# Patient Record
Sex: FEMALE | Race: WHITE | NOT HISPANIC OR LATINO | Employment: OTHER | URBAN - NONMETROPOLITAN AREA
[De-identification: names, ages, dates, MRNs, and addresses within clinical notes are randomized per-mention and may not be internally consistent; named-entity substitution may affect disease eponyms.]

---

## 2017-02-02 ENCOUNTER — OFFICE VISIT (OUTPATIENT)
Dept: MEDICAL GROUP | Facility: CLINIC | Age: 52
End: 2017-02-02
Payer: MEDICARE

## 2017-02-02 VITALS
DIASTOLIC BLOOD PRESSURE: 80 MMHG | SYSTOLIC BLOOD PRESSURE: 120 MMHG | HEIGHT: 63 IN | BODY MASS INDEX: 42.88 KG/M2 | RESPIRATION RATE: 18 BRPM | HEART RATE: 90 BPM | WEIGHT: 242 LBS | OXYGEN SATURATION: 95 % | TEMPERATURE: 97.9 F

## 2017-02-02 DIAGNOSIS — F17.200 SMOKER: ICD-10-CM

## 2017-02-02 DIAGNOSIS — J20.9 ACUTE BRONCHITIS, UNSPECIFIED ORGANISM: ICD-10-CM

## 2017-02-02 DIAGNOSIS — E66.01 MORBID OBESITY WITH BMI OF 40.0-44.9, ADULT (HCC): ICD-10-CM

## 2017-02-02 DIAGNOSIS — J30.2 SEASONAL ALLERGIC RHINITIS, UNSPECIFIED ALLERGIC RHINITIS TRIGGER: ICD-10-CM

## 2017-02-02 DIAGNOSIS — Z12.31 ENCOUNTER FOR SCREENING MAMMOGRAM FOR BREAST CANCER: ICD-10-CM

## 2017-02-02 DIAGNOSIS — Z80.0 FAMILY HISTORY OF COLON CANCER IN MOTHER: ICD-10-CM

## 2017-02-02 DIAGNOSIS — Z12.11 SCREENING FOR COLON CANCER: ICD-10-CM

## 2017-02-02 PROCEDURE — G8432 DEP SCR NOT DOC, RNG: HCPCS | Performed by: NURSE PRACTITIONER

## 2017-02-02 PROCEDURE — G8419 CALC BMI OUT NRM PARAM NOF/U: HCPCS | Performed by: NURSE PRACTITIONER

## 2017-02-02 PROCEDURE — 4004F PT TOBACCO SCREEN RCVD TLK: CPT | Performed by: NURSE PRACTITIONER

## 2017-02-02 PROCEDURE — 3014F SCREEN MAMMO DOC REV: CPT | Performed by: NURSE PRACTITIONER

## 2017-02-02 PROCEDURE — 99214 OFFICE O/P EST MOD 30 MIN: CPT | Performed by: NURSE PRACTITIONER

## 2017-02-02 PROCEDURE — G8484 FLU IMMUNIZE NO ADMIN: HCPCS | Performed by: NURSE PRACTITIONER

## 2017-02-02 RX ORDER — AZITHROMYCIN 250 MG/1
TABLET, FILM COATED ORAL
Qty: 6 TAB | Refills: 0 | Status: SHIPPED | OUTPATIENT
Start: 2017-02-02 | End: 2017-02-14

## 2017-02-02 RX ORDER — PREDNISONE 20 MG/1
60 TABLET ORAL DAILY
Qty: 15 TAB | Refills: 0 | Status: SHIPPED | OUTPATIENT
Start: 2017-02-02 | End: 2017-02-14

## 2017-02-02 RX ORDER — ALBUTEROL SULFATE 90 UG/1
2 AEROSOL, METERED RESPIRATORY (INHALATION) EVERY 6 HOURS PRN
Qty: 1 INHALER | Refills: 1 | Status: SHIPPED | OUTPATIENT
Start: 2017-02-02 | End: 2017-02-14 | Stop reason: SDUPTHER

## 2017-02-02 ASSESSMENT — PATIENT HEALTH QUESTIONNAIRE - PHQ9: CLINICAL INTERPRETATION OF PHQ2 SCORE: 1

## 2017-02-02 NOTE — PROGRESS NOTES
Subjective:     Rj Izquierdo is a 51 y.o. female here today for new onset cough.    Bronchitis, acute  Patient reports approximately 3 weeks ago she had increased cough. She has not taken her temperature however she reports feeling warm at times. Reports good appetite. Patient has a 40+ year pack year history of smoking. Patient reports intermittent shortness of breath. She often uses her son's albuterol inhaler for shortness of breath. Reports also a history of nasal allergies. She is not taking any medication for her allergies. Patient reports a history of upper respiratory illness, reports being treated in the past successfully with prednisone and a Z-Jimmy.    Seasonal allergic rhinitis  Patient reports history of seasonal allergies, unknown specific allergen. She is not taking medication to treat her allergies at this time.    Smoker  40+-pack-year history of smoking. Reports she is motivated to quit, however is interested in Chantix.    Morbid obesity with BMI of 40.0-44.9, adult (HCC)  Patient acknowledges she has gained weight since last appointment in September 2016. BMI has increased from 39-42. Patient reports she tries to be healthy. Does not exercise regularly.     Patient is due for annual wellness visit to include mammogram and colonoscopy. Patient is willing to get diagnostic testing completed.    Current medicines (including changes today)  Current Outpatient Prescriptions   Medication Sig Dispense Refill   • azithromycin (ZITHROMAX) 250 MG Tab Take 2 tablets today, then 1 tablet daily for 5 days 6 Tab 0   • predniSONE (DELTASONE) 20 MG Tab Take 3 Tabs by mouth every day. 15 Tab 0   • albuterol 108 (90 BASE) MCG/ACT Aero Soln inhalation aerosol Inhale 2 Puffs by mouth every 6 hours as needed for Shortness of Breath. 1 Inhaler 1   • ibuprofen (MOTRIN) 800 MG TABS Take 1 Tab by mouth every 8 hours as needed for Mild Pain. 30 Tab 3     No current facility-administered medications for this visit.  "      She  has a past medical history of Degenerative disc disease; Rheumatoid arthritis(714.0); ASTHMA; Psychiatric disorder; Anemia (2004); Hemorrhoids (2004); Bladder infection (2012); and Pneumonia (2009).    Social History     Social History   • Marital Status: Single     Spouse Name: N/A   • Number of Children: N/A   • Years of Education: N/A     Social History Main Topics   • Smoking status: Current Every Day Smoker -- 1.50 packs/day for 40 years     Types: Cigarettes   • Smokeless tobacco: Never Used   • Alcohol Use: No   • Drug Use: No   • Sexual Activity: Not Asked     Other Topics Concern   • None     Social History Narrative       Family History   Problem Relation Age of Onset   • Diabetes Father    • Cancer Mother          ROS  Positive for cough, intermittent shortness of breath. Negative for ear pain, sore throat. Negative for nausea, vomiting, diarrhea.  No fever, chest pain, no abdominal pain, no rashes    All other systems reviewed and are negative.        Objective:     Blood pressure 120/80, pulse 90, temperature 36.6 °C (97.9 °F), resp. rate 18, height 1.6 m (5' 3\"), weight 109.77 kg (242 lb), SpO2 95 %, not currently breastfeeding. Body mass index is 42.88 kg/(m^2).    Physical Exam:   Constitutional: Alert, no distress.  Eye: Equal, round and reactive, conjunctiva clear, lids normal. TMs normal, without erythema.   ENMT: Lips without lesions, good dentition, oropharynx clear.  Neck: Trachea midline, no masses, no thyromegaly. No cervical or supraclavicular lymphadenopathy  Respiratory: Unlabored respiratory effort, lungs clear to auscultation, bilateral expiratory wheezes, mild rhonchi present bilaterally near base of lungs.  Cardiovascular: Normal S1, S2, no murmur, no edema.   Abdomen: Soft, non-tender, no masses, no hepatosplenomegaly.  Skin: Warm, dry, good turgor, no rashes in visible areas.  Psych: Alert and oriented x3, normal affect and mood.        Assessment and Plan:   The following " treatment plan was discussed    1. Acute bronchitis, unspecified organism  Given patient's smoking history and length of illness, we will treat with antibiotics. Prescribed azithromycin 250 mg, take 2 tablets today, then one tablet daily for remaining 5 days. Prescribed prednisone pack to taken currently. Prescribed albuterol 1-2 puffs as needed every 4-6 hours for shortness of breath. Discussed signs and symptoms to seek emergent care. Advised patient return to clinic if symptoms worsening. Advise rest, increase hydration. Encouraged patient to make appoint for annual wellness exam.    2. Seasonal allergic rhinitis, unspecified allergic rhinitis trigger  Advised to take over-the-counter allergy medication daily. Advised patient may need to take daily throughout the year smoking history and frequent URI.    3. Morbid obesity with BMI of 40.0-44.9, adult (CMS-[facility])  Encourage patient to have healthy diet and regular exercise. Advise may discuss morning along with exam.  - Patient identified as having weight management issue.  Appropriate orders and counseling given.    4. Screening for colon cancer  Patient has a family history of colon cancer, strongly encouraged patient to obtain colonoscopy, patient agrees to plan and verbalizes importance.  - REFERRAL TO GI FOR COLONOSCOPY    5. Smoker  Advised patient to start cutting back by one cigarette per month. Advise me discussed use of Chantix at separate visit. Patient agrees to plan    6. Encounter for screening mammogram for breast cancer  - [id number]; Future      Reviewed indication, dosage, usage and potential adverse effects of  prescribed medications. Patient appears to understand, verbalizes understanding and is willing to try medications as prescribed.      Reviewed risks and benefits of treatment plan. Patient verbally agrees to plan of care.       Followup: Return if symptoms worsen or fail to improve.    PLEASE NOTE: This dictation was created  using voice recognition software. I have made every reasonable attempt to correct obvious errors, but I expect that there may be errors of grammar and possibly content that I did not discover prior finalizing this note.

## 2017-02-02 NOTE — TELEPHONE ENCOUNTER
Patient was in this a.m and didn't know what inhaler she uses so she brought it back in and is requesting a refill on this    Was the patient seen in the last year in this department? Yes     Does patient have an active prescription for medications requested? No     Received Request Via: Patient

## 2017-02-02 NOTE — TELEPHONE ENCOUNTER
Refill completed.   Please advise patient that at her next appt she needs to complete a breathing test. It does not appear we have filled this medication previously so she will need an appt for further refills. Thank you.   JULIANA Pierre

## 2017-02-02 NOTE — ASSESSMENT & PLAN NOTE
Patient reports history of seasonal allergies, unknown specific allergen. She is not taking medication to treat her allergies at this time.

## 2017-02-02 NOTE — ASSESSMENT & PLAN NOTE
Patient reports approximately 3 weeks ago she had increased cough. She has not taken her temperature however she reports feeling warm at times. Reports good appetite. Patient has a 40+ year pack year history of smoking. Patient reports intermittent shortness of breath. She often uses her son's albuterol inhaler for shortness of breath. Reports also a history of nasal allergies. She is not taking any medication for her allergies. Patient reports a history of upper respiratory illness, reports being treated in the past successfully with prednisone and a Z-Jimmy.

## 2017-02-02 NOTE — MR AVS SNAPSHOT
"        Rj NABILA Izquierdo   2017 10:00 AM   Office Visit   MRN: [id number]    Department:  [facility]   Dept Phone:  [phone]    Description:  Female : 1965   Provider:  RHODA Mccracken           Reason for Visit     Cough 3 weeks    Shortness of Breath           Allergies as of 2017     Allergen Noted Reactions    Amoxicillin 10/26/2008       Codeine 10/26/2008         You were diagnosed with     Acute bronchitis, unspecified organism   [9072836]       Morbid obesity with BMI of 40.0-44.9, adult (CMS-[facility])   [509957]       Screening for colon cancer   [063233]       Smoker   [372644]       Encounter for screening mammogram for breast cancer   [3080614]         Vital Signs     Blood Pressure Pulse Temperature Respirations Height Weight    120/80 mmHg 90 36.6 °C (97.9 °F) 18 1.6 m (5' 3\") 109.77 kg (242 lb)    Body Mass Index Oxygen Saturation Breastfeeding? Smoking Status          42.88 kg/m2 95% No Current Every Day Smoker        Basic Information     Date Of Birth Sex Race Ethnicity Preferred Language    1965 Female White Non- English      Your appointments     2017 10:00 AM   Established Patient with RHODA Mccracken   [facility] (--)    [address] [place] [address] [id number] [phone]           You will be receiving a confirmation call a few days before your appointment from our automated call confirmation system.            2017  9:40 AM   ANNUAL EXAM PREVENTATIVE with RHODA Mccracken   [facility] (--)    [address] [place] [address] [id number] [phone]              Problem List              ICD-10-CM Priority Class Noted - Resolved    Acute otitis externa of both ears H60.503   2016 - Present    Rash R21   2016 - Present    Possible exposure to STD Z20.2   2016 - Present    Vaginal discharge N89.8   2016 - Present    Morbid " obesity with BMI of 40.0-44.9, adult ([facility]) E66.01, Z68.41   2/2/2017 - Present    Smoker F17.200   2/2/2017 - Present    Bronchitis, acute J20.9   2/2/2017 - Present      Health Maintenance        Date Due Completion Dates    IMM DTaP/Tdap/Td Vaccine (1 - Tdap) 8/4/1985 ---    PAP SMEAR 8/4/1987 ---    MAMMOGRAM 8/4/2006 ---    COLONOSCOPY 8/4/2016 ---    IMM INFLUENZA (1) 9/1/2016 ---            Current Immunizations     No immunizations on file.      Below and/or attached are the medications your provider expects you to take. Review all of your home medications and newly ordered medications with your provider and/or pharmacist. Follow medication instructions as directed by your provider and/or pharmacist. Please keep your medication list with you and share with your provider. Update the information when medications are discontinued, doses are changed, or new medications (including over-the-counter products) are added; and carry medication information at all times in the event of emergency situations     Allergies:  AMOXICILLIN - (reactions not documented)     CODEINE - (reactions not documented)               Medications  Valid as of: February 02, 2017 -  9:46 AM    Generic Name Brand Name Tablet Size Instructions for use    Albuterol Sulfate (Aero Soln) albuterol 108 (90 BASE) MCG/ACT Inhale 2 Puffs by mouth every 6 hours as needed for Shortness of Breath.        Azithromycin (Tab) ZITHROMAX 250 MG Take 2 tablets today, then 1 tablet daily for 5 days        Ibuprofen (Tab) MOTRIN 800 MG Take 1 Tab by mouth every 8 hours as needed for Mild Pain.        PredniSONE (Tab) DELTASONE 20 MG Take 3 Tabs by mouth every day.        .                 Medicines prescribed today were sent to:     [facility] - [address]    [address] [place] [address]    Phone: [phone] Fax: [phone]    Open 24 Hours?: No      Medication refill instructions:       If your  prescription bottle indicates you have medication refills left, it is not necessary to call your provider’s office. Please contact your pharmacy and they will refill your medication.    If your prescription bottle indicates you do not have any refills left, you may request refills at any time through one of the following ways: The online ObserveIT system (except Urgent Care), by calling your provider’s office, or by asking your pharmacy to contact your provider’s office with a refill request. Medication refills are processed only during regular business hours and may not be available until the next business day. Your provider may request additional information or to have a follow-up visit with you prior to refilling your medication.   *Please Note: Medication refills are assigned a new Rx number when refilled electronically. Your pharmacy may indicate that no refills were authorized even though a new prescription for the same medication is available at the pharmacy. Please request the medicine by name with the pharmacy before contacting your provider for a refill.        Referral     A referral request has been sent to our patient care coordination department. Please allow 3-5 business days for us to process this request and contact you either by phone or mail. If you do not hear from us by the 5th business day, please call us at [phone].           ObserveIT Access Code: [email or web address]  Expires: 2/10/2017  3:09 PM    ObserveIT  A secure, online tool to manage your health information     [employer]’s ObserveIT® is a secure, online tool that connects you to your personalized health information from the privacy of your home -- day or night - making it very easy for you to manage your healthcare. Once the activation process is completed, you can even access your medical information using the ObserveIT henry, which is available for free in the [employer] store or Google Play store.     ObserveIT provides the following  levels of access (as shown below):   My Chart Features   [facility] Doctor [facility]  Specialists [facility]  Urgent  Care [facility]  Primary Care  Doctor   Email your healthcare team securely and privately 24/7 X X X    Manage appointments: schedule your next appointment; view details of past/upcoming appointments X      Request prescription refills. X      View recent personal medical records, including lab and immunizations X X X X   View health record, including health history, allergies, medications X X X X   Read reports about your outpatient visits, procedures, consult and ER notes X X X X   See your discharge summary, which is a recap of your hospital and/or ER visit that includes your diagnosis, lab results, and care plan. X X       How to register for Pairin:  1. Go to  [email or web address].  2. Click on the Sign Up Now box, which takes you to the New Member Sign Up page. You will need to provide the following information:  a. Enter your Pairin Access Code exactly as it appears at the top of this page. (You will not need to use this code after you’ve completed the sign-up process. If you do not sign up before the expiration date, you must request a new code.)   b. Enter your date of birth.   c. Enter your home email address.   d. Click Submit, and follow the next screen’s instructions.  3. Create a Pairin ID. This will be your Pairin login ID and cannot be changed, so think of one that is secure and easy to remember.  4. Create a Pairin password. You can change your password at any time.  5. Enter your Password Reset Question and Answer. This can be used at a later time if you forget your password.   6. Enter your e-mail address. This allows you to receive e-mail notifications when new information is available in Pairin.  7. Click Sign Up. You can now view your health information.    For assistance activating your Pairin account, call [phone]

## 2017-02-02 NOTE — ASSESSMENT & PLAN NOTE
Patient acknowledges she has gained weight since last appointment in September 2016. BMI has increased from 39-42. Patient reports she tries to be healthy. Does not exercise regularly.

## 2017-02-02 NOTE — PATIENT INSTRUCTIONS

## 2017-02-14 ENCOUNTER — OFFICE VISIT (OUTPATIENT)
Dept: MEDICAL GROUP | Facility: CLINIC | Age: 52
End: 2017-02-14
Payer: MEDICARE

## 2017-02-14 VITALS
BODY MASS INDEX: 44.47 KG/M2 | RESPIRATION RATE: 20 BRPM | TEMPERATURE: 99.3 F | HEART RATE: 62 BPM | OXYGEN SATURATION: 92 % | SYSTOLIC BLOOD PRESSURE: 140 MMHG | WEIGHT: 251 LBS | HEIGHT: 63 IN | DIASTOLIC BLOOD PRESSURE: 80 MMHG

## 2017-02-14 DIAGNOSIS — R05.9 COUGH: ICD-10-CM

## 2017-02-14 DIAGNOSIS — F43.9 STRESS AT HOME: ICD-10-CM

## 2017-02-14 DIAGNOSIS — F17.200 SMOKER: ICD-10-CM

## 2017-02-14 PROCEDURE — 99214 OFFICE O/P EST MOD 30 MIN: CPT | Performed by: NURSE PRACTITIONER

## 2017-02-14 PROCEDURE — 4004F PT TOBACCO SCREEN RCVD TLK: CPT | Performed by: NURSE PRACTITIONER

## 2017-02-14 PROCEDURE — G8432 DEP SCR NOT DOC, RNG: HCPCS | Performed by: NURSE PRACTITIONER

## 2017-02-14 PROCEDURE — G8419 CALC BMI OUT NRM PARAM NOF/U: HCPCS | Performed by: NURSE PRACTITIONER

## 2017-02-14 PROCEDURE — G8484 FLU IMMUNIZE NO ADMIN: HCPCS | Performed by: NURSE PRACTITIONER

## 2017-02-14 PROCEDURE — 3014F SCREEN MAMMO DOC REV: CPT | Performed by: NURSE PRACTITIONER

## 2017-02-14 RX ORDER — ALBUTEROL SULFATE 90 UG/1
2 AEROSOL, METERED RESPIRATORY (INHALATION) EVERY 6 HOURS PRN
Qty: 1 INHALER | Refills: 1 | Status: SHIPPED | OUTPATIENT
Start: 2017-02-14 | End: 2017-10-12 | Stop reason: SDUPTHER

## 2017-02-14 RX ORDER — BENZONATATE 100 MG/1
100 CAPSULE ORAL 3 TIMES DAILY PRN
Qty: 60 CAP | Refills: 0 | Status: SHIPPED | OUTPATIENT
Start: 2017-02-14 | End: 2017-11-09

## 2017-02-14 NOTE — PATIENT INSTRUCTIONS
Obtain Chest Xray as soon as possible     Your medical care was provided today by: JULIANA Pierre    Thank You for the opportunity to serve you.    You may receive a brief survey in the mail shortly regarding your visit today. Please take a few moments to complete the survey and return it; no postage is necessary. We are working to serve our patient population better, improve customer service and our patients overall experience and your input can help us to accomplish this. We thank you for your help and for the opportunity to serve you today and in the future.     Special Instructions:  Always call 9-1-1 immediately if you develop a life threatening emergency.    Unless told otherwise please take all medications as directed and complete prescription therapies.     Watch for the following signs that require additional evaluation: progressive lethargy or unresponsiveness, localized pain (chest, abdomen), shortness of breath, painful breathing, progressive vomiting with weakness, bloody stools, or new rash.     If you are prescribed pain medication or any other medication that is sedating, do not take medication before or while operating a vehicle or heavy machinery or equipment due to potential side effects such as drowsiness and/or dizziness.

## 2017-02-14 NOTE — ASSESSMENT & PLAN NOTE
Patient reports she has cut back on her smoking. Currently she is starting a cigarette taking a few puffs, then discarding the cigarette. She is not motivated to quit yet.

## 2017-02-14 NOTE — MR AVS SNAPSHOT
"        Rj DAHL Izquierdo   2017 1:40 PM   Office Visit   MRN: [id number]    Department:  [facility]   Dept Phone:  [phone]    Description:  Female : 1965   Provider:  RHODA Mccracken           Reason for Visit     Cough 4 days      Allergies as of 2017     Allergen Noted Reactions    Amoxicillin 10/26/2008       Codeine 10/26/2008         You were diagnosed with     Cough   [786.2.ICD-9-CM]       Stress at home   [697575]       Smoker   [642151]         Vital Signs     Blood Pressure Pulse Temperature Respirations Height Weight    140/80 mmHg 62 37.4 °C (99.3 °F) 20 1.6 m (5' 2.99\") 113.853 kg (251 lb)    Body Mass Index Oxygen Saturation Smoking Status             44.47 kg/m2 92% Current Every Day Smoker         Basic Information     Date Of Birth Sex Race Ethnicity Preferred Language    1965 Female White Non- English      Your appointments     2017  9:40 AM   ANNUAL EXAM PREVENTATIVE with RHODA Mccracken   [facility] (--)    [address] [place] [id number]   [phone]              Problem List              ICD-10-CM Priority Class Noted - Resolved    Morbid obesity with BMI of 40.0-44.9, adult (HCC) E66.01, Z68.41   2017 - Present    Smoker F17.200   2017 - Present    Cough R05   2017 - Present    Seasonal allergic rhinitis J30.2   2017 - Present    Family history of colon cancer in mother Z80.0   2017 - Present    Stress at home F43.9   2017 - Present      Health Maintenance        Date Due Completion Dates    IMM DTaP/Tdap/Td Vaccine (1 - Tdap) 1985 ---    IMM PNEUMOCOCCAL 19-64 (ADULT) MEDIUM RISK SERIES (1 of 1 - PPSV23) 1985 ---    PAP SMEAR 1987 ---    MAMMOGRAM 2006 ---    COLONOSCOPY 2016 ---    IMM INFLUENZA (1) 2016 ---            Current Immunizations     No immunizations on file.      Below and/or attached are the medications your provider " expects you to take. Review all of your home medications and newly ordered medications with your provider and/or pharmacist. Follow medication instructions as directed by your provider and/or pharmacist. Please keep your medication list with you and share with your provider. Update the information when medications are discontinued, doses are changed, or new medications (including over-the-counter products) are added; and carry medication information at all times in the event of emergency situations     Allergies:  AMOXICILLIN - (reactions not documented)     CODEINE - (reactions not documented)               Medications  Valid as of: February 14, 2017 -  5:01 PM    Generic Name Brand Name Tablet Size Instructions for use    Albuterol Sulfate (Aero Soln) albuterol 108 (90 BASE) MCG/ACT Inhale 2 Puffs by mouth every 6 hours as needed for Shortness of Breath.        Beclomethasone Dipropionate (Aero Soln) QVAR 80 MCG/ACT Inhale 1 Puff by mouth 2 times a day.        Benzonatate (Cap) TESSALON 100 MG Take 1 Cap by mouth 3 times a day as needed for Cough.        Ibuprofen (Tab) MOTRIN 800 MG Take 1 Tab by mouth every 8 hours as needed for Mild Pain.        .                 Medicines prescribed today were sent to:     [facility] [address] [place] [address] [facility]    [address] [place] [id number]    Phone: [phone] Fax: [phone]    Open 24 Hours?: No      Medication refill instructions:       If your prescription bottle indicates you have medication refills left, it is not necessary to call your provider’s office. Please contact your pharmacy and they will refill your medication.    If your prescription bottle indicates you do not have any refills left, you may request refills at any time through one of the following ways: The online PaxVax system (except Urgent Care), by calling your provider’s office, or by asking your pharmacy to contact your provider’s office with a refill  request. Medication refills are processed only during regular business hours and may not be available until the next business day. Your provider may request additional information or to have a follow-up visit with you prior to refilling your medication.   *Please Note: Medication refills are assigned a new Rx number when refilled electronically. Your pharmacy may indicate that no refills were authorized even though a new prescription for the same medication is available at the pharmacy. Please request the medicine by name with the pharmacy before contacting your provider for a refill.        Your To Do List     Future Labs/Procedures Complete By Expires    DX-CHEST-2 VIEWS  As directed 2/14/2018      Instructions    Obtain Chest Xray as soon as possible     Your medical care was provided today by: JULIANA Pierre    Thank You for the opportunity to serve you.    You may receive a brief survey in the mail shortly regarding your visit today. Please take a few moments to complete the survey and return it; no postage is necessary. We are working to serve our patient population better, improve customer service and our patients overall experience and your input can help us to accomplish this. We thank you for your help and for the opportunity to serve you today and in the future.     Special Instructions:  Always call 9-1-1 immediately if you develop a life threatening emergency.    Unless told otherwise please take all medications as directed and complete prescription therapies.     Watch for the following signs that require additional evaluation: progressive lethargy or unresponsiveness, localized pain (chest, abdomen), shortness of breath, painful breathing, progressive vomiting with weakness, bloody stools, or new rash.     If you are prescribed pain medication or any other medication that is sedating, do not take medication before or while operating a vehicle or heavy machinery or equipment due to potential  side effects such as drowsiness and/or dizziness.            bVisual Access Code: [email or web address]  Expires: 3/11/2017  2:00 PM    bVisual  A secure, online tool to manage your health information     [employer]’s bVisual® is a secure, online tool that connects you to your personalized health information from the privacy of your home -- day or night - making it very easy for you to manage your healthcare. Once the activation process is completed, you can even access your medical information using the bVisual henry, which is available for free in the Apple Henry store or Google Play store.     bVisual provides the following levels of access (as shown below):   My Chart Features   [facility] Doctor [facility]  Specialists [facility]  Urgent  [facility]  Primary Care  Doctor   Email your healthcare team securely and privately 24/7 X X X    Manage appointments: schedule your next appointment; view details of past/upcoming appointments X      Request prescription refills. X      View recent personal medical records, including lab and immunizations X X X X   View health record, including health history, allergies, medications X X X X   Read reports about your outpatient visits, procedures, consult and ER notes X X X X   See your discharge summary, which is a recap of your hospital and/or ER visit that includes your diagnosis, lab results, and care plan. X X       How to register for bVisual:  1. Go to  [email or web address].  2. Click on the Sign Up Now box, which takes you to the New Member Sign Up page. You will need to provide the following information:  a. Enter your bVisual Access Code exactly as it appears at the top of this page. (You will not need to use this code after you’ve completed the sign-up process. If you do not sign up before the expiration date, you must request a new code.)   b. Enter your date of birth.   c. Enter your home email address.   d. Click Submit, and follow the next screen’s  instructions.  3. Create a Bouncefootballt ID. This will be your Bouncefootballt login ID and cannot be changed, so think of one that is secure and easy to remember.  4. Create a Bouncefootballt password. You can change your password at any time.  5. Enter your Password Reset Question and Answer. This can be used at a later time if you forget your password.   6. Enter your e-mail address. This allows you to receive e-mail notifications when new information is available in TagSeats.  7. Click Sign Up. You can now view your health information.    For assistance activating your TagSeats account, call [phone]

## 2017-02-14 NOTE — PROGRESS NOTES
"Subjective:     Rj Izquierdo is a 51 y.o. female here today for new onset cough.     Smoker  Patient reports she has cut back on her smoking. Currently she is starting a cigarette taking a few puffs, then discarding the cigarette. She is not motivated to quit yet.    Cough  Patient reports since being seen last she completed her Z-Jimmy and prednisone. Patient reports she completed the prednisone on February 8. Then reported that over the weekend her cough returned. She reports feeling very stressed at home.    She reports she has not taken her Qvar since the summer, reports that her insurance would not cover it. She has been using her albuterol inhaler approximately 3-4 times a day.    Stress at home  Patient reports feeling extremely stressed at home. Patient reports that sometimes she feels that her skin is crawling, she feels like her skin is sometimes burning. She has a 12-year-old son who has missed a lot of school due to illness. She has some high stressed as since he has been somewhat school the school district is \"on her \". She also reports that she is worried about her 's health. She denies any history of severe anxiety however reports that recently she has been more anxious thinking about her family's health and well-being.         Current medicines (including changes today)  Current Outpatient Prescriptions   Medication Sig Dispense Refill   • albuterol 108 (90 BASE) MCG/ACT Aero Soln inhalation aerosol Inhale 2 Puffs by mouth every 6 hours as needed for Shortness of Breath. 1 Inhaler 1   • benzonatate (TESSALON PERLES) 100 MG Cap Take 1 Cap by mouth 3 times a day as needed for Cough. 60 Cap 0   • ibuprofen (MOTRIN) 800 MG TABS Take 1 Tab by mouth every 8 hours as needed for Mild Pain. 30 Tab 3   • beclomethasone (QVAR) 80 MCG/ACT inhaler Inhale 1 Puff by mouth 2 times a day. (Patient not taking: Reported on 2/14/2017) 7.3 g 0     No current facility-administered medications for this visit. " "      She  has a past medical history of Degenerative disc disease; Rheumatoid arthritis(714.0); ASTHMA; Psychiatric disorder; Anemia (2004); Hemorrhoids (2004); Bladder infection (2012); and Pneumonia (2009).    Social History     Social History   • Marital Status: Single     Spouse Name: N/A   • Number of Children: N/A   • Years of Education: N/A     Social History Main Topics   • Smoking status: Current Every Day Smoker -- 1.50 packs/day for 40 years     Types: Cigarettes   • Smokeless tobacco: Never Used   • Alcohol Use: No   • Drug Use: No   • Sexual Activity: Not Asked     Other Topics Concern   • None     Social History Narrative       Family History   Problem Relation Age of Onset   • Diabetes Father    • Cancer Mother          ROS  Positive for cough. Positive for anxiety.  No fever, no chest pain, no shortness of breath, no abdominal pain, no rashes    All other systems reviewed and are negative.        Objective:     Blood pressure 140/80, pulse 62, temperature 37.4 °C (99.3 °F), resp. rate 20, height 1.6 m (5' 2.99\"), weight 113.853 kg (251 lb), SpO2 92 %. Body mass index is 44.47 kg/(m^2).    Physical Exam:   Constitutional: Alert, patient appears in distress at times, when speaking about her family.   Eye: Equal, round and reactive, conjunctiva clear, lids normal.   ENMT: Lips without lesions, good dentition, oropharynx clear. TMs normal, without erythema, canals patent. Normal appearance of external nose. No nasal drainage noted.   Neck: Trachea midline, no masses, no thyromegaly. No cervical or supraclavicular lymphadenopathy  Respiratory: Unlabored respiratory effort, lungs clear to auscultation, no wheezes, no ronchi. Patient does cough during clinic.  Cardiovascular: Normal S1, S2, no murmur, no edema.   Skin: Warm, throughout appointment patient becomes diaphoretic when talking about her family and getting upset, good turgor, no rashes in visible areas.  Neuro: normal sensation in extremities. "   Psych: Alert and oriented x3.         Assessment and Plan:   The following treatment plan was discussed    1. Cough  Advised patient to obtain chest x-ray as soon as possible. Will provide Tessalon 100 mg 3 times a day when necessary for cough. Advised continued use of albuterol as needed. Patient likely needs continued ICS, will look into possible equivalent to Qvar of which her insurance may cover. Patient has an upcoming appointment next week. Discussed signs and symptoms to seek emergent care.   - DX-CHEST-2 VIEWS; Future    2. Stress at home  It appears patient is so worried about family's health and well-being that she is making herself physically ill. Discussed at length with patient the importance of taking care of herself. Also discussed anxiety and possibly seeing a counselor/therapist. Patient does not believe she needs that at this time however she is open to the possibility in the future. It is also possible that recent prednisone use made her anxiety slightly worse, patient felt well while taking prednisone, began to feel worse after stopping. Advised patient prednisone should not be taken daily. Advised will continue to monitor. Advised patient if she becomes extremely stressed at which time it affects her breathing, she should seek emergent care. Patient denies SI/HI.    3. Smoker  Encouraged patient to continue to cut back smoking. Discussed risks of continued smoking.      Reviewed indication, dosage, usage and potential adverse effects of prescribed medications. Patient appears to understand, verbalizes understanding and is willing to try medications as prescribed.      Reviewed risks and benefits of treatment plan. Patient verbally agrees to plan of care.       Followup: Return in about 1 week (around 2/21/2017).    KELLEY Mccracken.     PLEASE NOTE: This dictation was created using voice recognition software. I have made every reasonable attempt to correct obvious errors, but I  expect that there may be errors of grammar and possibly content that I did not discover prior finalizing this note.

## 2017-02-14 NOTE — ASSESSMENT & PLAN NOTE
Patient reports since being seen last she completed her Z-Jimmy and prednisone. Patient reports she completed the prednisone on February 8. Then reported that over the weekend her cough returned. She reports feeling very stressed at home.    She reports she has not taken her Qvar since the summer, reports that her insurance would not cover it. She has been using her albuterol inhaler approximately 3-4 times a day.

## 2017-02-14 NOTE — ASSESSMENT & PLAN NOTE
"Patient reports feeling extremely stressed at home. Patient reports that sometimes she feels that her skin is crawling, she feels like her skin is sometimes burning. She has a 12-year-old son who has missed a lot of school due to illness. She has some high stressed as since he has been somewhat school the school district is \"on her \". She also reports that she is worried about her 's health. She denies any history of severe anxiety however reports that recently she has been more anxious thinking about her family's health and well-being.  "

## 2017-02-15 ENCOUNTER — APPOINTMENT (OUTPATIENT)
Dept: RADIOLOGY | Facility: IMAGING CENTER | Age: 52
End: 2017-02-15
Attending: NURSE PRACTITIONER
Payer: MEDICARE

## 2017-02-15 ENCOUNTER — NON-PROVIDER VISIT (OUTPATIENT)
Dept: URGENT CARE | Facility: PHYSICIAN GROUP | Age: 52
End: 2017-02-15
Payer: MEDICARE

## 2017-02-15 ENCOUNTER — TELEPHONE (OUTPATIENT)
Dept: MEDICAL GROUP | Facility: CLINIC | Age: 52
End: 2017-02-15

## 2017-02-15 DIAGNOSIS — R05.9 COUGH: ICD-10-CM

## 2017-02-15 PROCEDURE — 71020 DX-CHEST-2 VIEWS: CPT | Mod: 26 | Performed by: PHYSICIAN ASSISTANT

## 2017-02-16 ENCOUNTER — TELEPHONE (OUTPATIENT)
Dept: MEDICAL GROUP | Facility: CLINIC | Age: 52
End: 2017-02-16

## 2017-02-16 NOTE — TELEPHONE ENCOUNTER
Patient called wanting her cxr results.  I gave her the results per Rocael Curtis's note.  The patient wanted antibiotics, I said I would send a note back to Rocael but she is out of the office until Tuesday.  I suggested she come in an see the Urgent care provider.  She said she is just going to the hospital.

## 2017-02-16 NOTE — TELEPHONE ENCOUNTER
Please let patient know that her xray does not necessarily rule out pneumonia. If her symptoms do not improve or if she develops fever she should be reevaluated. She should abstain from smoking as much as possible.

## 2017-02-21 NOTE — TELEPHONE ENCOUNTER
----- Message from RHODA Mccracken sent at 2/21/2017  7:44 AM PST -----  Please advise patient that her chest xray does show possible pneumonia in bases, however, this could also be a shadowing from her breast tissue. Please advise to keep her appt on 2/23, it is important she does not miss this appt. We can discuss possible treatment. If she is having any worsening symptoms of fever, lethargy, she should be seen sooner. Thank you.   JULIANA Pierre

## 2017-02-23 ENCOUNTER — OFFICE VISIT (OUTPATIENT)
Dept: MEDICAL GROUP | Facility: CLINIC | Age: 52
End: 2017-02-23
Payer: MEDICARE

## 2017-02-23 ENCOUNTER — TELEPHONE (OUTPATIENT)
Dept: MEDICAL GROUP | Facility: CLINIC | Age: 52
End: 2017-02-23

## 2017-02-23 VITALS
OXYGEN SATURATION: 96 % | HEART RATE: 84 BPM | HEIGHT: 63 IN | SYSTOLIC BLOOD PRESSURE: 128 MMHG | BODY MASS INDEX: 43.94 KG/M2 | DIASTOLIC BLOOD PRESSURE: 82 MMHG | RESPIRATION RATE: 16 BRPM | TEMPERATURE: 98.4 F | WEIGHT: 248 LBS

## 2017-02-23 DIAGNOSIS — J30.2 SEASONAL ALLERGIC RHINITIS, UNSPECIFIED ALLERGIC RHINITIS TRIGGER: ICD-10-CM

## 2017-02-23 DIAGNOSIS — E66.01 MORBID OBESITY WITH BMI OF 40.0-44.9, ADULT (HCC): ICD-10-CM

## 2017-02-23 DIAGNOSIS — Z00.00 MEDICARE ANNUAL WELLNESS VISIT, INITIAL: ICD-10-CM

## 2017-02-23 DIAGNOSIS — F33.1 MODERATE EPISODE OF RECURRENT MAJOR DEPRESSIVE DISORDER (HCC): ICD-10-CM

## 2017-02-23 DIAGNOSIS — F43.9 STRESS AT HOME: ICD-10-CM

## 2017-02-23 DIAGNOSIS — Z72.0 TOBACCO ABUSE DISORDER: ICD-10-CM

## 2017-02-23 DIAGNOSIS — Z80.0 FAMILY HISTORY OF COLON CANCER IN MOTHER: ICD-10-CM

## 2017-02-23 DIAGNOSIS — R05.9 COUGH: ICD-10-CM

## 2017-02-23 DIAGNOSIS — J42 CHRONIC BRONCHITIS, UNSPECIFIED CHRONIC BRONCHITIS TYPE (HCC): ICD-10-CM

## 2017-02-23 PROCEDURE — G8432 DEP SCR NOT DOC, RNG: HCPCS | Performed by: NURSE PRACTITIONER

## 2017-02-23 PROCEDURE — 4004F PT TOBACCO SCREEN RCVD TLK: CPT | Performed by: NURSE PRACTITIONER

## 2017-02-23 PROCEDURE — G0438 PPPS, INITIAL VISIT: HCPCS | Performed by: NURSE PRACTITIONER

## 2017-02-23 RX ORDER — ALBUTEROL SULFATE 0.63 MG/3ML
0.63 SOLUTION RESPIRATORY (INHALATION) 3 TIMES DAILY PRN
Qty: 270 ML | Refills: 3 | Status: SHIPPED | OUTPATIENT
Start: 2017-02-23 | End: 2018-02-14 | Stop reason: SDUPTHER

## 2017-02-23 RX ORDER — CITALOPRAM 20 MG/1
20 TABLET ORAL DAILY
Qty: 30 TAB | Refills: 3 | Status: SHIPPED | OUTPATIENT
Start: 2017-02-23 | End: 2017-11-09

## 2017-02-23 ASSESSMENT — PATIENT HEALTH QUESTIONNAIRE - PHQ9
CLINICAL INTERPRETATION OF PHQ2 SCORE: 4
SUM OF ALL RESPONSES TO PHQ QUESTIONS 1-9: 16
5. POOR APPETITE OR OVEREATING: 3 - NEARLY EVERY DAY

## 2017-02-23 NOTE — ASSESSMENT & PLAN NOTE
Patient was recently seen in urgent care and prescribed a nebulizer, patient reports she is doing much better, cough is still intermittent and present however she is feeling much better than her last visit. She continues to cut back on her smoking. Her most recent x-ray showed possible pneumonitis in her lung bases, however this may also be related to her breast tissue shadowing. Denies any fever.

## 2017-02-23 NOTE — MR AVS SNAPSHOT
"        Retina A Izquierdo   2017 9:40 AM   Office Visit   MRN: [id number]    [facility] Phone:  [phone]    Description:  Female : 1965   Provider:  RHODA Mccracken           Reason for Visit     Annual Wellness Visit           Allergies as of 2017     Allergen Noted Reactions    Amoxicillin 10/26/2008       Codeine 10/26/2008       Pcn [Penicillins] 2017         You were diagnosed with     Medicare annual wellness visit, initial   [469166]       Moderate episode of recurrent major depressive disorder (CMS-HCC)   [8827295]       Morbid obesity with BMI of 40.0-44.9, adult (CMS-HCC)   [169191]       Tobacco abuse disorder   [950143]       Cough   [786.2.ICD-9-CM]       Seasonal allergic rhinitis, unspecified allergic rhinitis trigger   [9054778]       Stress at home   [068310]       Family history of colon cancer in mother   [3869476]         Vital Signs     Blood Pressure Pulse Temperature Respirations Height Weight    128/82 mmHg 84 36.9 °C (98.4 °F) 16 1.6 m (5' 2.99\") 112.492 kg (248 lb)    Body Mass Index Oxygen Saturation Smoking Status             43.94 kg/m2 96% Current Every Day Smoker         Basic Information     Date Of Birth Sex Race Ethnicity Preferred Language    1965 Female White Non- English      Your appointments     May 23, 2017  9:20 AM   Established Patient with RHODA Mccracken   [facility] (--)    [address] [id number] [phone]           You will be receiving a confirmation call a few days before your appointment from our automated call confirmation system.              Problem List              ICD-10-CM Priority Class Noted - Resolved    Morbid obesity with BMI of 40.0-44.9, adult ([facility]) E66.01, Z68.41   2017 - Present    Tobacco abuse disorder Z72.0   2017 - Present    Cough R05   2017 - Present    Seasonal allergic rhinitis J30.2   " 2/2/2017 - Present    Family history of colon cancer in mother Z80.0   2/2/2017 - Present    Stress at home F43.9   2/14/2017 - Present    Moderate episode of recurrent major depressive disorder (CMS-HCC) F33.1   2/23/2017 - Present      Health Maintenance        Date Due Completion Dates    IMM DTaP/Tdap/Td Vaccine (1 - Tdap) 8/4/1985 ---    IMM PNEUMOCOCCAL 19-64 (ADULT) MEDIUM RISK SERIES (1 of 1 - PPSV23) 8/4/1985 ---    PAP SMEAR 8/4/1987 ---    MAMMOGRAM 8/4/2006 ---    COLONOSCOPY 8/4/2016 ---    IMM INFLUENZA (1) 9/1/2016 ---            Current Immunizations     No immunizations on file.      Below and/or attached are the medications your provider expects you to take. Review all of your home medications and newly ordered medications with your provider and/or pharmacist. Follow medication instructions as directed by your provider and/or pharmacist. Please keep your medication list with you and share with your provider. Update the information when medications are discontinued, doses are changed, or new medications (including over-the-counter products) are added; and carry medication information at all times in the event of emergency situations     Allergies:  AMOXICILLIN - (reactions not documented)     CODEINE - (reactions not documented)     PCN - (reactions not documented)               Medications  Valid as of: February 23, 2017 - 10:24 AM    Generic Name Brand Name Tablet Size Instructions for use    Albuterol Sulfate (Aero Soln) albuterol 108 (90 BASE) MCG/ACT Inhale 2 Puffs by mouth every 6 hours as needed for Shortness of Breath.        Beclomethasone Dipropionate (Aero Soln) QVAR 80 MCG/ACT Inhale 1 Puff by mouth 2 times a day.        Benzonatate (Cap) TESSALON 100 MG Take 1 Cap by mouth 3 times a day as needed for Cough.        Citalopram Hydrobromide (Tab) CELEXA 20 MG Take 1 Tab by mouth every day.        Ibuprofen (Tab) MOTRIN 800 MG Take 1 Tab by mouth every 8 hours as needed for Mild Pain.           .                 Medicines prescribed today were sent to:     [facility]  [place], [place] - [address] [facility]    [address] [facility] [address]    Phone: [phone] Fax: [phone]    Open 24 Hours?: No      Medication refill instructions:       If your prescription bottle indicates you have medication refills left, it is not necessary to call your provider’s office. Please contact your pharmacy and they will refill your medication.    If your prescription bottle indicates you do not have any refills left, you may request refills at any time through one of the following ways: The online [employer] system (except Urgent Care), by calling your provider’s office, or by asking your pharmacy to contact your provider’s office with a refill request. Medication refills are processed only during regular business hours and may not be available until the next business day. Your provider may request additional information or to have a follow-up visit with you prior to refilling your medication.   *Please Note: Medication refills are assigned a new Rx number when refilled electronically. Your pharmacy may indicate that no refills were authorized even though a new prescription for the same medication is available at the pharmacy. Please request the medicine by name with the pharmacy before contacting your provider for a refill.        Your To Do List     Future Labs/Procedures Complete By Expires    DX-CHEST-2 VIEWS  As directed 3/19/2018      Instructions                 [facility]  [address] [place] [id number] [phone]             Please call and make your appt for your Colonsocopy.      Repeat your chest xray in 1 month, or sooner if you are getting worse. Call us please. Continue to cut back on your smoking.     Start Celexa, 1/2 tablet for 1 week.     Your medical care was provided today by: JULIANA Pierre    Thank You for the opportunity to serve  you.    You may receive a brief survey in the mail shortly regarding your visit today. Please take a few moments to complete the survey and return it; no postage is necessary. We are working to serve our patient population better, improve customer service and our patients overall experience and your input can help us to accomplish this. We thank you for your help and for the opportunity to serve you today and in the future.     Special Instructions:  Always call 9-1-1 immediately if you develop a life threatening emergency.    Unless told otherwise please take all medications as directed and complete prescription therapies.     Watch for the following signs that require additional evaluation: progressive lethargy or unresponsiveness, localized pain (chest, abdomen), shortness of breath, painful breathing, progressive vomiting with weakness, bloody stools, or new rash.     If you are prescribed pain medication or any other medication that is sedating, do not take medication before or while operating a vehicle or heavy machinery or equipment due to potential side effects such as drowsiness and/or dizziness.           Gogo Access Code: [email or web address]  Expires: 3/11/2017  2:00 PM    Gogo  A secure, online tool to manage your health information     [employer]’s Gogo® is a secure, online tool that connects you to your personalized health information from the privacy of your home -- day or night - making it very easy for you to manage your healthcare. Once the activation process is completed, you can even access your medical information using the Gogo henry, which is available for free in the Apple Henry store or Google Play store.     Gogo provides the following levels of access (as shown below):   My Chart Features   [facility] Doctor [facility]  Specialists [facility]  Urgent  Care [facility]  Primary Care  Doctor   Email your healthcare team securely and privately 24/7 X X X    Manage appointments:  schedule your next appointment; view details of past/upcoming appointments X      Request prescription refills. X      View recent personal medical records, including lab and immunizations X X X X   View health record, including health history, allergies, medications X X X X   Read reports about your outpatient visits, procedures, consult and ER notes X X X X   See your discharge summary, which is a recap of your hospital and/or ER visit that includes your diagnosis, lab results, and care plan. X X       How to register for "[employer]:  1. Go to  [email or web address].  2. Click on the Sign Up Now box, which takes you to the New Member Sign Up page. You will need to provide the following information:  a. Enter your "[employer] Access Code exactly as it appears at the top of this page. (You will not need to use this code after you’ve completed the sign-up process. If you do not sign up before the expiration date, you must request a new code.)   b. Enter your date of birth.   c. Enter your home email address.   d. Click Submit, and follow the next screen’s instructions.  3. Create a "[employer] ID. This will be your "[employer] login ID and cannot be changed, so think of one that is secure and easy to remember.  4. Create a "[employer] password. You can change your password at any time.  5. Enter your Password Reset Question and Answer. This can be used at a later time if you forget your password.   6. Enter your e-mail address. This allows you to receive e-mail notifications when new information is available in "[employer].  7. Click Sign Up. You can now view your health information.    For assistance activating your "[employer] account, call [phone]

## 2017-02-23 NOTE — ASSESSMENT & PLAN NOTE
Patient has been referred for screening colonoscopy, she requests today the phone number so she can call make an appointment. Denies any concerning symptoms, no blood in stool.

## 2017-02-23 NOTE — PROGRESS NOTES
Depression Screening    Little interest or pleasure in doing things?  1 - several days  Feeling down, depressed , or hopeless? 3 - nearly every day  Trouble falling or staying asleep, or sleeping too much?  3 - nearly every day  Feeling tired or having little energy?  3 - nearly every day  Poor appetite or overeating?  3 - nearly every day  Feeling bad about yourself - or that you are a failure or have let yourself or your family down? 0 - not at all  Trouble concentrating on things, such as reading the newspaper or watching television? 3 - nearly every day  Moving or speaking so slowly that other people could have noticed.  Or the opposite - being so fidgety or restless that you have been moving around a lot more than usual?  0 - not at all  Thoughts that you would be better off dead, or of hurting yourself?  0 - not at all  Patient Health Questionnaire Score: 16    If depressive symptoms identified deferred to follow up visit unless specifically addressed in assesment and plan.      Screening for Cognitive Impairment    Three Minute Recall (banana, sunrise, fence)  3/3    Draw clock face with all 12 numbers set to the hand to show 10 minures past 11 o'clock       Cognitive concerns identified defferred for follow up unless specifically addressed in assesment and plan.    Fall Risk Assessment    Has the patient had two or more falls in the last year or any fall with injury in the last year?  Yes    Safety Assessment    Throw rugs on floor.  No  Handrails on all stairs.  No  Good lighting in all hallways.     Difficulty hearing.  Yes, reports she is in her 'own little world' and she is trying not listen   Patient counseled about all safety risks that were identified.    Functional Assessment ADLs    Are there any barriers preventing you from cooking for yourself or meeting nutritional needs?  No.    Are there any barriers preventing you from driving safely or obtaining transportation?  No.    Are there any barriers  preventing you from using a telephone or calling for help?  No.    Are there any barriers preventing you from shopping?  No.    Are there any barriers preventing you from taking care of your own finances?  No.    Are there any barriers preventing you from managing your medications?  No.    Are currently engaing any exercise or physical activity?  Yes.  walking    Health Maintenance Summary                Annual Wellness Visit Overdue 1965     IMM DTaP/Tdap/Td Vaccine Overdue 8/4/1985     IMM PNEUMOCOCCAL 19-64 (ADULT) MEDIUM RISK SERIES Overdue 8/4/1985     PAP SMEAR Overdue 8/4/1987     MAMMOGRAM Overdue 8/4/2006     COLONOSCOPY Overdue 8/4/2016     IMM INFLUENZA Overdue 9/1/2016           Patient Care Team:  RHODA Mccracken as PCP - General (Family Medicine)         HPI:  Retina is a 51 y.o. here for Medicare Annual Wellness Visit     Tobacco abuse disorder  Patient has been smoking for over 40 years. She is not motivated to quit today, however reports she has been cutting back on her smoking, she is now making one pack last almost 2 days. She may be interested in trying Chantix in the future.    Morbid obesity with BMI of 40.0-44.9, adult (HCC)  Patient reports she feels hungry all the time, the cystoscopy by her stress and anxiety. She is trying to go for walks more often, the cold weather makes her cough more.    Cough  Patient was recently seen in urgent care and prescribed a nebulizer, patient reports she is doing much better, cough is still intermittent and present however she is feeling much better than her last visit. She continues to cut back on her smoking. Her most recent x-ray showed possible pneumonitis in her lung bases, however this may also be related to her breast tissue shadowing. Denies any fever.    Seasonal allergic rhinitis  Patient takes over-the-counter allergy medication when needed.    Family history of colon cancer in mother  Patient has been referred for screening  colonoscopy, she requests today the phone number so she can call make an appointment. Denies any concerning symptoms, no blood in stool.    Stress at home  Patient reports she continues to have some marital problems at home. Per her report her  has been having an affair. She reports is often contributes to her anxiety and stress levels.    Moderate episode of recurrent major depressive disorder (CMS-HCC)  Patient reports new onset depression. She reports sometimes she doesn't want to get out of bed, finds herself sleeping frequently. She also reports at times she does not want to complete any chores around the house. Denies any SI/HI. She has never taken any medication for depression in the past. She is open to medication management. She is not interested in counseling at this time.        Patient Active Problem List    Diagnosis Date Noted   • Moderate episode of recurrent major depressive disorder (CMS-HCC) 02/23/2017   • Stress at home 02/14/2017   • Morbid obesity with BMI of 40.0-44.9, adult ([facility]) 02/02/2017   • Tobacco abuse disorder 02/02/2017   • Cough 02/02/2017   • Seasonal allergic rhinitis 02/02/2017   • Family history of colon cancer in mother 02/02/2017       Current medicines including changes today:   Current Outpatient Prescriptions   Medication Sig Dispense Refill   • citalopram (CELEXA) 20 MG Tab Take 1 Tab by mouth every day. 30 Tab 3   • albuterol 108 (90 BASE) MCG/ACT Aero Soln inhalation aerosol Inhale 2 Puffs by mouth every 6 hours as needed for Shortness of Breath. 1 Inhaler 1   • benzonatate (TESSALON PERLES) 100 MG Cap Take 1 Cap by mouth 3 times a day as needed for Cough. 60 Cap 0   • beclomethasone (QVAR) 80 MCG/ACT inhaler Inhale 1 Puff by mouth 2 times a day. 7.3 g 0   • ibuprofen (MOTRIN) 800 MG TABS Take 1 Tab by mouth every 8 hours as needed for Mild Pain. (Patient not taking: Reported on 2/23/2017) 30 Tab 3     No current facility-administered medications for this visit.         The patient reports adherence to this regimen   Current supplements as per medication list.   Chronic narcotic pain medicines: no     Allergies: Amoxicillin; Codeine; and Pcn    Current social contact/activities: She has been trying to get out of the house more, does not have a lot of friends    Exercise: She has been going for walks, but cold air does make it difficult     She  reports that she has been smoking Cigarettes.  She has a 40 pack-year smoking history. She has never used smokeless tobacco. She reports that she does not drink alcohol or use illicit drugs.  Ready to quit: No  Counseling given: Yes        DPA/Advanced directive: No    ROS:    Gait: Uses no assistive device   Ostomy: no   Other tubes: no   Amputations: no   Chronic oxygen use no   Last eye exam: 2016   : Denies incontinence.     Screening:  Depression Screening    Little interest or pleasure in doing things?  1 - several days  Feeling down, depressed , or hopeless? 3 - nearly every day  Trouble falling or staying asleep, or sleeping too much?  3 - nearly every day  Feeling tired or having little energy?  3 - nearly every day  Poor appetite or overeating?  3 - nearly every day  Feeling bad about yourself - or that you are a failure or have let yourself or your family down? 0 - not at all  Trouble concentrating on things, such as reading the newspaper or watching television? 3 - nearly every day  Moving or speaking so slowly that other people could have noticed.  Or the opposite - being so fidgety or restless that you have been moving around a lot more than usual?  0 - not at all  Thoughts that you would be better off dead, or of hurting yourself?  0 - not at all  Patient Health Questionnaire Score: 16    If depressive symptoms identified deferred to follow up visit unless specifically addressed in assesment and plan.      Screening for Cognitive Impairment    Three Minute Recall (banana, sunrise, fence)  3/3    Draw clock face with  all 12 numbers set to the hand to show 10 minures past 11 o'clock       Cognitive concerns identified defferred for follow up unless specifically addressed in assesment and plan.    Fall Risk Assessment    Has the patient had two or more falls in the last year or any fall with injury in the last year?  Yes    Safety Assessment    Throw rugs on floor.  No  Handrails on all stairs.  No  Good lighting in all hallways.     Difficulty hearing.  Yes  Patient counseled about all safety risks that were identified.    Functional Assessment ADLs    Are there any barriers preventing you from cooking for yourself or meeting nutritional needs?  No.    Are there any barriers preventing you from driving safely or obtaining transportation?  No.    Are there any barriers preventing you from using a telephone or calling for help?  No.    Are there any barriers preventing you from shopping?  No.    Are there any barriers preventing you from taking care of your own finances?  No.    Are there any barriers preventing you from managing your medications?  No.    Are currently engaing any exercise or physical activity?  Yes.  walking    Health Maintenance Summary                Annual Wellness Visit Overdue 1965     IMM DTaP/Tdap/Td Vaccine Overdue 8/4/1985     IMM PNEUMOCOCCAL 19-64 (ADULT) MEDIUM RISK SERIES Overdue 8/4/1985     PAP SMEAR Overdue 8/4/1987     MAMMOGRAM Overdue 8/4/2006     COLONOSCOPY Overdue 8/4/2016     IMM INFLUENZA Overdue 9/1/2016           Patient Care Team:  RHODA Mcrcacken as PCP - General (Family Medicine)      Social History   Substance Use Topics   • Smoking status: Current Every Day Smoker -- 1.00 packs/day for 40 years     Types: Cigarettes   • Smokeless tobacco: Never Used      Comment: Patient has been cutting back., making 1 pack last almost 2 days 2/23/17   • Alcohol Use: No     Family History   Problem Relation Age of Onset   • Diabetes Father    • Cancer Mother      She  has a past  "medical history of Degenerative disc disease; Rheumatoid arthritis(714.0); ASTHMA; Psychiatric disorder; Anemia (2004); Hemorrhoids (2004); Bladder infection (2012); and Pneumonia (2009).   Past Surgical History   Procedure Laterality Date   • Tonsillectomy and adenoidectomy     • Endometrial ablation  2005   • Abdominal hysterectomy total  11/2015           Exam:     Blood pressure 128/82, pulse 84, temperature 36.9 °C (98.4 °F), resp. rate 16, height 1.6 m (5' 2.99\"), weight 112.492 kg (248 lb), SpO2 96 %. Body mass index is 43.94 kg/(m^2).    Hearing good.    Dentition good  Alert, oriented in no acute distress.  Eye contact is good, speech goal directed, affect calm    Assessment and Plan. The following treatment and monitoring plan is recommended:      1. Medicare annual wellness visit, initial  - Initial Wellness Visit - Includes PPPS ()  - Patient identified as fall risk.  Appropriate orders and counseling given.    2. Moderate episode of recurrent major depressive disorder (CMS-[facility])  We'll start patient on 20 mg Celexa at bedtime. Advised for the first 1-2 weeks may cut tablet in half to 10 mg. Discussed signs and symptoms to seek emergent care. Advised to return to clinic in 3 months for follow-up, or sooner as needed. Denies any SI, discussed black box warning.  - Patient has been identified as being depressed and appropriate orders and counseling have been given    3. Morbid obesity with BMI of 40.0-44.9, adult ([facility])  Encouraged healthy eating habits, continue to walk as tolerated.    4. Tobacco abuse disorder  Encouraged to continue to cut back on smoking, may consider Chantix in the future.    5. Cough  Patient is feeling much improved, encourage continue smoking cessation. Advised to repeat chest x-ray in one month for comparison. Return to clinic sooner if symptoms begin to worsen. Advised to continue use nebulizer as needed.     6. Seasonal allergic rhinitis, unspecified allergic rhinitis " trigger  Patient is stable.    7. Stress at home  Discussed some coping techniques.  - Patient has been identified as being depressed and appropriate orders and counseling have been given    8. Family history of colon cancer in mother  Encourage patient to call make appointment for screening colonoscopy, patient plans to do so in the next week.        Services needed: None  Health Care Screening recommendations as per orders if indicated.  Referrals offered: PT/OT/Nutrition counseling/Behavioral Health/Smoking cessation as per orders if indicated.    Discussion today about general wellness and lifestyle habits:    · Prevent falls and reduce trip hazards; Cautioned about securing or removing rugs.  · Have a working fire alarm and carbon monoxide detector;   · Engage in regular physical activity and social activities        Follow-up: Return in about 3 months (around 5/23/2017) for Short.  5 YEAR PLAN:  Flu vaccine advised every year.  Colon cancer screening per GI recommendation.     JLUIETTE Mccracken.[email or web address].    PLEASE NOTE: This dictation was created using voice recognition software. I have made every reasonable attempt to correct obvious errors, but I expect that there may be errors of grammar and possibly content that I did not discover prior finalizing this note.

## 2017-02-23 NOTE — TELEPHONE ENCOUNTER
Pt needs order for nebulizer. Dx is Chronic Bronchitis. She uses albuterol 3 mL tid per her prescription from [facility]. Per [employer] the prescription needs to have frequency of use and medication pt will be using on it. If you have any questions please let me know.

## 2017-02-23 NOTE — ASSESSMENT & PLAN NOTE
Patient reports new onset depression. She reports sometimes she doesn't want to get out of bed, finds herself sleeping frequently. She also reports at times she does not want to complete any chores around the house. Denies any SI/HI. She has never taken any medication for depression in the past. She is open to medication management. She is not interested in counseling at this time.

## 2017-02-23 NOTE — ASSESSMENT & PLAN NOTE
Patient has been smoking for over 40 years. She is not motivated to quit today, however reports she has been cutting back on her smoking, she is now making one pack last almost 2 days. She may be interested in trying Chantix in the future.

## 2017-02-23 NOTE — ASSESSMENT & PLAN NOTE
Patient reports she feels hungry all the time, the cystoscopy by her stress and anxiety. She is trying to go for walks more often, the cold weather makes her cough more.

## 2017-02-23 NOTE — PATIENT INSTRUCTIONS
[facility]  [address] [address] [phone]             Please call and make your appt for your Colonsocopy.      Repeat your chest xray in 1 month, or sooner if you are getting worse. Call us please. Continue to cut back on your smoking.     Start Celexa, 1/2 tablet for 1 week.     Your medical care was provided today by: JULIANA Pierre    Thank You for the opportunity to serve you.    You may receive a brief survey in the mail shortly regarding your visit today. Please take a few moments to complete the survey and return it; no postage is necessary. We are working to serve our patient population better, improve customer service and our patients overall experience and your input can help us to accomplish this. We thank you for your help and for the opportunity to serve you today and in the future.     Special Instructions:  Always call 9-1-1 immediately if you develop a life threatening emergency.    Unless told otherwise please take all medications as directed and complete prescription therapies.     Watch for the following signs that require additional evaluation: progressive lethargy or unresponsiveness, localized pain (chest, abdomen), shortness of breath, painful breathing, progressive vomiting with weakness, bloody stools, or new rash.     If you are prescribed pain medication or any other medication that is sedating, do not take medication before or while operating a vehicle or heavy machinery or equipment due to potential side effects such as drowsiness and/or dizziness.

## 2017-03-28 ENCOUNTER — TELEPHONE (OUTPATIENT)
Dept: MEDICAL GROUP | Facility: CLINIC | Age: 52
End: 2017-03-28

## 2017-03-31 ENCOUNTER — NON-PROVIDER VISIT (OUTPATIENT)
Dept: MEDICAL GROUP | Facility: CLINIC | Age: 52
End: 2017-03-31
Payer: MEDICARE

## 2017-03-31 DIAGNOSIS — Z23 NEED FOR VACCINATION: ICD-10-CM

## 2017-03-31 DIAGNOSIS — Z23 NEED FOR TDAP VACCINATION: ICD-10-CM

## 2017-03-31 PROCEDURE — 90715 TDAP VACCINE 7 YRS/> IM: CPT | Performed by: NURSE PRACTITIONER

## 2017-03-31 PROCEDURE — 90471 IMMUNIZATION ADMIN: CPT | Performed by: NURSE PRACTITIONER

## 2017-04-03 ENCOUNTER — APPOINTMENT (OUTPATIENT)
Dept: RADIOLOGY | Facility: IMAGING CENTER | Age: 52
End: 2017-04-03
Attending: NURSE PRACTITIONER
Payer: MEDICARE

## 2017-04-03 ENCOUNTER — NON-PROVIDER VISIT (OUTPATIENT)
Dept: URGENT CARE | Facility: PHYSICIAN GROUP | Age: 52
End: 2017-04-03
Payer: MEDICARE

## 2017-04-03 DIAGNOSIS — R05.9 COUGH: ICD-10-CM

## 2017-04-03 PROCEDURE — 71020 DX-CHEST-2 VIEWS: CPT | Mod: 26 | Performed by: PHYSICIAN ASSISTANT

## 2017-09-14 NOTE — TELEPHONE ENCOUNTER
Called patient left a message for her to call clinic.  
Can she stop in while I am in the office Thursday or Friday? We can order it for her then as a drop in. I would recommend she also get her Tdap while she is here. There is currently a whooping cough epidemic in [place].     JULIANA Pierre    
I also have in my notes she needs a f/u chest xray from last month. That is already ordered, she should get that done prior to the procedure as well.   JULIANA Pierre    
Pt called back, I advised her to come in to clinic for immunizations per Rocael  
Pt was just seen at [facility] and she is scheduled for colonoscopy, she stated they texted her recommending she get a pneumonia vaccine from her primary before her procedure next Tuesday. Please advise.  
[id number]

## 2017-10-12 ENCOUNTER — OFFICE VISIT (OUTPATIENT)
Dept: MEDICAL GROUP | Facility: CLINIC | Age: 52
End: 2017-10-12
Payer: MEDICARE

## 2017-10-12 VITALS
OXYGEN SATURATION: 95 % | HEART RATE: 95 BPM | RESPIRATION RATE: 16 BRPM | SYSTOLIC BLOOD PRESSURE: 132 MMHG | TEMPERATURE: 98 F | WEIGHT: 254 LBS | HEIGHT: 63 IN | BODY MASS INDEX: 45 KG/M2 | DIASTOLIC BLOOD PRESSURE: 82 MMHG

## 2017-10-12 DIAGNOSIS — J22 ACUTE RESPIRATORY INFECTION: ICD-10-CM
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
## 2017-10-12 DIAGNOSIS — E66.01 MORBID OBESITY WITH BMI OF 40.0-44.9, ADULT (HCC): ICD-10-CM

## 2017-10-12 DIAGNOSIS — Z72.0 TOBACCO ABUSE DISORDER: ICD-10-CM

## 2017-10-12 PROCEDURE — 99214 OFFICE O/P EST MOD 30 MIN: CPT | Mod: 25 | Performed by: PHYSICIAN ASSISTANT

## 2017-10-12 PROCEDURE — 99406 BEHAV CHNG SMOKING 3-10 MIN: CPT | Performed by: PHYSICIAN ASSISTANT

## 2017-10-12 RX ORDER — ALBUTEROL SULFATE 90 UG/1
2 AEROSOL, METERED RESPIRATORY (INHALATION) EVERY 6 HOURS PRN
Qty: 1 INHALER | Refills: 1 | Status: SHIPPED | OUTPATIENT
Start: 2017-10-12

## 2017-10-12 RX ORDER — AMOXICILLIN AND CLAVULANATE POTASSIUM 875; 125 MG/1; MG/1
1 TABLET, FILM COATED ORAL 2 TIMES DAILY
Qty: 14 TAB | Refills: 0 | Status: SHIPPED | OUTPATIENT
Start: 2017-10-12 | End: 2017-11-09

## 2017-10-12 RX ORDER — BUPROPION HYDROCHLORIDE 100 MG/1
TABLET ORAL
Qty: 60 TAB | Refills: 2 | Status: SHIPPED | OUTPATIENT
Start: 2017-10-12 | End: 2017-12-28 | Stop reason: SDUPTHER

## 2017-10-12 NOTE — ASSESSMENT & PLAN NOTE
Patient smokes about 1.5ppd on average. Her  recently quit smoking and she would like to quit as well. She knows that it is harmful for her child but she continues to smoke indoors and around her child.

## 2017-10-12 NOTE — PROGRESS NOTES
Chief Complaint   Patient presents with   • Cough       HISTORY OF PRESENT ILLNESS: Patient is a 52 y.o. female established patient who presents today for evaluation and management of:    Flea bite of multiple sites  Patient has exquisitely itchy red spots around her waistline and ankles. She states this has gotten worse in the last couple weeks. The weather has recently changed from being warm to quite cool at nights and her cats have been coming inside more. Her cats sleep in her bed.     Tobacco abuse disorder  Patient smokes about 1.5ppd on average. Her  recently quit smoking and she would like to quit as well. She knows that it is harmful for her child but she continues to smoke indoors and around her child.     Acute respiratory infection  HPI: 14 days of illness including: nasal congestion, clear/whitish rhinorrhea, sore throat, cough , and sputum production Sinus pain and pressure: bilateral maxillary  Symptoms negative for swollen glands, chest pain, hemoptysis,.  Treatments tried: none   Since onset, symptoms are worse   Similarly ill exposures: yes  Medical history positive for asthma/COPD  She  reports that she has been smoking Cigarettes.  She has a 40.00 pack-year smoking history. She has never used smokeless tobacco.    ROS  No fever. productive cough. No skin rashes.  No N/V/D       Patient Active Problem List    Diagnosis Date Noted   • Acute respiratory infection 10/12/2017   • Flea bite of multiple sites 10/12/2017   • Moderate episode of recurrent major depressive disorder (CMS-[facility]) 02/23/2017   • Stress at home 02/14/2017   • Morbid obesity with BMI of 40.0-44.9, adult ([facility]) 02/02/2017   • Tobacco abuse disorder 02/02/2017   • Cough 02/02/2017   • Seasonal allergic rhinitis 02/02/2017   • Family history of colon cancer in mother 02/02/2017       Allergies:Pcn [penicillins]; Codeine; and Amoxicillin    Current Outpatient Prescriptions   Medication Sig Dispense Refill   • albuterol 108 (90  Base) MCG/ACT Aero Soln inhalation aerosol Inhale 2 Puffs by mouth every 6 hours as needed for Shortness of Breath. 1 Inhaler 1   • amoxicillin-clavulanate (AUGMENTIN) 875-125 MG Tab Take 1 Tab by mouth 2 times a day. 14 Tab 0   • buPROPion (WELLBUTRIN) 100 MG Tab Take 50 mg PO qam for 4 days, then, 50 mg PO BID for 4 days then, 100 mg BID PO thereafter 60 Tab 2   • albuterol (ACCUNEB) 0.63 MG/3ML nebulizer solution 3 mL by Nebulization route 3 times a day as needed for Shortness of Breath. 270 mL 3   • citalopram (CELEXA) 20 MG Tab Take 1 Tab by mouth every day. 30 Tab 3   • benzonatate (TESSALON PERLES) 100 MG Cap Take 1 Cap by mouth 3 times a day as needed for Cough. 60 Cap 0   • beclomethasone (QVAR) 80 MCG/ACT inhaler Inhale 1 Puff by mouth 2 times a day. 7.3 g 0     No current facility-administered medications for this visit.        Social History   Substance Use Topics   • Smoking status: Current Every Day Smoker     Packs/day: 1.00     Years: 40.00     Types: Cigarettes   • Smokeless tobacco: Never Used      Comment: Patient has been cutting back., making 1 pack last almost 2 days 2/23/17   • Alcohol use No       Family Status   Relation Status   • Mother Alive   • Father Alive     Family History   Problem Relation Age of Onset   • Diabetes Father    • Cancer Mother        Review of Systems: See above.  Constitutional: Positive for occasional sweats and chills. Negative for fever, weight loss.   HENT: Positive nasal sinus congestion see history of present illness above. Negative for ear pain, nosebleeds, and neck pain.    Eyes: Negative for blurred vision.   Respiratory: Positive for cough, sputum production, shortness of breath and wheezing.    Cardiovascular: Positive for orthopnea. Negative for chest pain, palpitations, and leg swelling.   Skin: Positive for rash and itching.   Neurological: Negative for dizziness, tingling, tremors, sensory change, focal weakness and headaches.     Exam:  Blood pressure  "132/82, pulse 95, temperature 36.7 °C (98 °F), resp. rate 16, height 1.6 m (5' 3\"), weight 115.2 kg (254 lb), SpO2 95 %.  Body mass index is 44.99 kg/m².  General:  Obese female in NAD  Head: is grossly normal.   Neck: Supple without masses. Thyroid is not visibly enlarged.  Pulmonary: See Exam above.   Cardiovascular: Unable to auscultate heart sounds above loud lung sounds. Radial pulses are intact and equal bilaterally.  Extremities: no clubbing, cyanosis, or edema.    Medical decision-making and discussion:  1. Acute respiratory infection  - albuterol 108 (90 Base) MCG/ACT Aero Soln inhalation aerosol; Inhale 2 Puffs by mouth every 6 hours as needed for Shortness of Breath.  Dispense: 1 Inhaler; Refill: 1  - amoxicillin-clavulanate (AUGMENTIN) 875-125 MG Tab; Take 1 Tab by mouth 2 times a day.  Dispense: 14 Tab; Refill: 0    2. Flea bite of multiple sites  Patient advised to thoroughly clean her hoem, apply tick treatments to all pets, wash all linens and vacuum her home in addition to using a bug bomb in her home that kills fleas. She was advised that this may be due to recent temperature change although patient is convinced a couple of women that live near her have intentionally given fleas to her cats.     3. Tobacco abuse disorder  Patient encouraged to continue to reduce cigarette smoking as she has already slowed down recently due to illness. She was advised to set a quit date and stick with this as it is clearly causing health problems for her and her child. Patient was advised for approximately 7 minutes regarding this issue. She states she has tried patches in the past and they did not work for her.  - buPROPion (WELLBUTRIN) 100 MG Tab; Take 50 mg PO qam for 4 days, then, 50 mg PO BID for 4 days then, 100 mg BID PO thereafter  Dispense: 60 Tab; Refill: 2    4. Morbid obesity with BMI of 40.0-44.9, adult (HCC)  Patient advised to stop drinking soda, increase exercise and increase daily intake of " vegetables.  - Patient identified as having weight management issue.  Appropriate orders and counseling given.      Please note that this dictation was created using voice recognition software. I have made every reasonable attempt to correct obvious errors, but I expect that there are errors of grammar and possibly content that I did not discover before finalizing the note.      Return in about 4 weeks (around 11/9/2017) for smoking follow up, vaccines.

## 2017-10-12 NOTE — ASSESSMENT & PLAN NOTE
Patient has exquisitely itchy red spots around her waistline and ankles. She states this has gotten worse in the last couple weeks. The weather has recently changed from being warm to quite cool at nights and her cats have been coming inside more. Her cats sleep in her bed.

## 2017-10-12 NOTE — ASSESSMENT & PLAN NOTE
HPI: 14 days of illness including: nasal congestion, clear/whitish rhinorrhea, sore throat, cough , and sputum production Sinus pain and pressure: bilateral maxillary  Symptoms negative for swollen glands, chest pain, hemoptysis,.  Treatments tried: none   Since onset, symptoms are worse   Similarly ill exposures: yes  Medical history positive for asthma/COPD  She  reports that she has been smoking Cigarettes.  She has a 40.00 pack-year smoking history. She has never used smokeless tobacco.    ROS  No fever. productive cough. No skin rashes.  No N/V/D

## 2017-11-09 ENCOUNTER — OFFICE VISIT (OUTPATIENT)
Dept: MEDICAL GROUP | Facility: CLINIC | Age: 52
End: 2017-11-09
Payer: MEDICARE

## 2017-11-09 VITALS
DIASTOLIC BLOOD PRESSURE: 76 MMHG | SYSTOLIC BLOOD PRESSURE: 142 MMHG | TEMPERATURE: 98.7 F | BODY MASS INDEX: 45.71 KG/M2 | HEIGHT: 63 IN | HEART RATE: 96 BPM | OXYGEN SATURATION: 96 % | WEIGHT: 258 LBS

## 2017-11-09 DIAGNOSIS — Z72.0 TOBACCO ABUSE DISORDER: ICD-10-CM

## 2017-11-09 DIAGNOSIS — J22 ACUTE RESPIRATORY INFECTION: ICD-10-CM

## 2017-11-09 DIAGNOSIS — F33.1 MODERATE EPISODE OF RECURRENT MAJOR DEPRESSIVE DISORDER (HCC): ICD-10-CM
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
## 2017-11-09 DIAGNOSIS — Z23 NEED FOR VACCINATION: ICD-10-CM

## 2017-11-09 PROCEDURE — 90686 IIV4 VACC NO PRSV 0.5 ML IM: CPT | Performed by: PHYSICIAN ASSISTANT

## 2017-11-09 PROCEDURE — G0008 ADMIN INFLUENZA VIRUS VAC: HCPCS | Performed by: PHYSICIAN ASSISTANT

## 2017-11-09 PROCEDURE — 99406 BEHAV CHNG SMOKING 3-10 MIN: CPT | Performed by: PHYSICIAN ASSISTANT

## 2017-11-09 PROCEDURE — 90732 PPSV23 VACC 2 YRS+ SUBQ/IM: CPT | Performed by: PHYSICIAN ASSISTANT

## 2017-11-09 PROCEDURE — 99214 OFFICE O/P EST MOD 30 MIN: CPT | Mod: 25 | Performed by: PHYSICIAN ASSISTANT

## 2017-11-09 PROCEDURE — G0009 ADMIN PNEUMOCOCCAL VACCINE: HCPCS | Performed by: PHYSICIAN ASSISTANT

## 2017-11-09 ASSESSMENT — PATIENT HEALTH QUESTIONNAIRE - PHQ9: CLINICAL INTERPRETATION OF PHQ2 SCORE: 0

## 2017-11-09 NOTE — ASSESSMENT & PLAN NOTE
Patient states that after she was seen on October 12, 2017 her symptoms began to improve with Augmentin use however, she continued to feel ill so presented to urgent care where she was given doxycycline and a stronger albuterol nebulizer solution. She states she is feeling much better today although not 100%.

## 2017-11-09 NOTE — PROGRESS NOTES
"Chief Complaint   Patient presents with   • Follow-Up     smoking   • Foot Pain     right heel pain       HISTORY OF PRESENT ILLNESS: Patient is a 52 y.o. female established patient who presents today for evaluation and management of:    Tobacco abuse disorder  Patient is down to 0.5 ppd from 1.5 ppd about 1 month ago. She continues to work hard to quit and has decided that she would like to be done smoking by Thanksgiving, 2017 and feels this is a reasonable goal for her. Since she is smoking approximately 10 cigarettes per day, this gives her 1-2 days to remain at the same level as a previous stay however, she will be reducing her cigarette intake by approximately one cigarette per day.    Moderate episode of recurrent major depressive disorder (CMS-HCC)  Patient had been using Celexa 20 mg tablets daily however, she has not had this medication for approximately 2 months. She states that since she has started using Wellbutrin to help her quit smoking, she feels this is actually improving her depression more than the Celexa had been. She would like to continue using this medication after she has quit smoking to alleviate her depression symptoms. She states she feels \"much calmer \" while she is taking the Wellbutrin. She denies suicidal or homicidal ideations.    Flea bite of multiple sites  This has resolved since noted on October 12, 2017.    Acute respiratory infection  Patient states that after she was seen on October 12, 2017 her symptoms began to improve with Augmentin use however, she continued to feel ill so presented to urgent care where she was given doxycycline and a stronger albuterol nebulizer solution. She states she is feeling much better today although not 100%.       Patient Active Problem List    Diagnosis Date Noted   • Acute respiratory infection 10/12/2017   • Flea bite of multiple sites 10/12/2017   • Moderate episode of recurrent major depressive disorder (CMS-[facility]) 02/23/2017   • Stress at " home 02/14/2017   • Morbid obesity with BMI of 40.0-44.9, adult ([facility]) 02/02/2017   • Tobacco abuse disorder 02/02/2017   • Cough 02/02/2017   • Seasonal allergic rhinitis 02/02/2017   • Family history of colon cancer in mother 02/02/2017       Allergies:Pcn [penicillins]; Codeine; and Amoxicillin    Current Outpatient Prescriptions   Medication Sig Dispense Refill   • albuterol 108 (90 Base) MCG/ACT Aero Soln inhalation aerosol Inhale 2 Puffs by mouth every 6 hours as needed for Shortness of Breath. 1 Inhaler 1   • buPROPion (WELLBUTRIN) 100 MG Tab Take 50 mg PO qam for 4 days, then, 50 mg PO BID for 4 days then, 100 mg BID PO thereafter 60 Tab 2   • albuterol (ACCUNEB) 0.63 MG/3ML nebulizer solution 3 mL by Nebulization route 3 times a day as needed for Shortness of Breath. 270 mL 3     No current facility-administered medications for this visit.        Social History   Substance Use Topics   • Smoking status: Current Every Day Smoker     Packs/day: 1.50     Years: 40.00     Types: Cigarettes   • Smokeless tobacco: Never Used      Comment: down to 0.5 PPD   • Alcohol use No       Family Status   Relation Status   • Mother Alive   • Father Alive   • Child Alive     Family History   Problem Relation Age of Onset   • Cancer Mother    • Diabetes Father        Review of Systems:   Constitutional: Negative for fever, chills, weight loss and Positive for improving malaise/fatigue.   HENT: Negative for ear pain, nosebleeds,  sore throat and neck pain.    Eyes: Negative for blurred vision.   Respiratory: Positive for cough, sputum production, shortness of breath that is getting better and wheezing.    Cardiovascular: Negative for chest pain, palpitations, orthopnea and leg swelling.   Gastrointestinal: Positive for one instance of vomiting after using doxycycline resolved at this time. Negative for heartburn,and abdominal pain.   Genitourinary: Negative for dysuria, urgency and frequency.   Skin: Negative for rash and  "itching.   Neurological: Positive for mild lightheadedness after Wellbutrin dosages. Negative for dizziness, tingling, tremors, sensory change, focal weakness and headaches.   Endo/Heme/Allergies: Does not bruise/bleed easily.   Psychiatric/Behavioral: Positive for well-controlled depression. Negative for suicidal ideas and memory loss.  The patient is occasionally nervous/anxious and does not have insomnia.      Exam:  Blood pressure 142/76, pulse 96, temperature 37.1 °C (98.7 °F), height 1.6 m (5' 3\"), weight 117 kg (258 lb), SpO2 96 %.  Body mass index is 45.7 kg/m².  General:  Obese female in NAD  Head: is grossly normal.  Neck: Supple without masses. Thyroid is not visibly enlarged.  Pulmonary: Wet cough throughout exam. Normal effort. Cardiovascular: Radial pulses are intact and equal bilaterally.  Extremities: Clubbing present in all fingernails bilaterally. No cyanosis, or edema.    Medical decision-making and discussion:  1. Tobacco abuse disorder  Ready to quit: Yes  Counseling given: Yes  Approximately 7 minutes was spent to discuss tobacco use cessation with the patient .    2. Need for vaccination  - Flu Quad Inj >3 Year Pre-Filled PF  - PneumoVax PPV23 =>3yo    3. Moderate episode of recurrent major depressive disorder (CMS-HCC)  Patient would like to continue using Wellbutrin after she has quit smoking in order to alleviate her depression symptoms. She states this is working much better than Celexa although it does make her somewhat sleepy during the day.    4. Flea bite of multiple sites  Resolved.    5. Acute respiratory infection  Resolved      Please note that this dictation was created using voice recognition software. I have made every reasonable attempt to correct obvious errors, but I expect that there are errors of grammar and possibly content that I did not discover before finalizing the note.      Return in about 2 weeks (around 11/22/2017) for smoking follow up.  "

## 2017-11-09 NOTE — ASSESSMENT & PLAN NOTE
"Patient had been using Celexa 20 mg tablets daily however, she has not had this medication for approximately 2 months. She states that since she has started using Wellbutrin to help her quit smoking, she feels this is actually improving her depression more than the Celexa had been. She would like to continue using this medication after she has quit smoking to alleviate her depression symptoms. She states she feels \"much calmer \" while she is taking the Wellbutrin. She denies suicidal or homicidal ideations.  "

## 2017-11-09 NOTE — ASSESSMENT & PLAN NOTE
Patient is down to 0.5 ppd from 1.5 ppd about 1 month ago. She continues to work hard to quit and has decided that she would like to be done smoking by Segundo 2017 and feels this is a reasonable goal for her. Since she is smoking approximately 10 cigarettes per day, this gives her 1-2 days to remain at the same level as a previous stay however, she will be reducing her cigarette intake by approximately one cigarette per day.

## 2017-12-01 RX ORDER — NYSTATIN 100000 [USP'U]/G
POWDER TOPICAL 4 TIMES DAILY
COMMUNITY

## 2017-12-02 NOTE — TELEPHONE ENCOUNTER
Patient came in with her son - she has been getting rashes under her breasts, she showed me and let me know that she has been using a nystatin powder that she was prescribed at her previous doctor.  Can you refill her? She will come in and be seen if she needs an appt for this.       Was the patient seen in the last year in this department? Yes     Does patient have an active prescription for medications requested? No     Received Request Via: Patient

## 2017-12-04 RX ORDER — NYSTATIN 100000 [USP'U]/G
POWDER TOPICAL 4 TIMES DAILY
Qty: 15 G | OUTPATIENT
Start: 2017-12-04

## 2017-12-04 NOTE — TELEPHONE ENCOUNTER
Patient needs to make an appointment for assessment of this rash as she has a history of a couple different types of rashes.

## 2017-12-28 ENCOUNTER — OFFICE VISIT (OUTPATIENT)
Dept: MEDICAL GROUP | Facility: CLINIC | Age: 52
End: 2017-12-28
Payer: MEDICARE

## 2017-12-28 VITALS
BODY MASS INDEX: 45.54 KG/M2 | OXYGEN SATURATION: 98 % | DIASTOLIC BLOOD PRESSURE: 90 MMHG | SYSTOLIC BLOOD PRESSURE: 152 MMHG | WEIGHT: 257 LBS | TEMPERATURE: 98.7 F | HEIGHT: 63 IN | RESPIRATION RATE: 18 BRPM | HEART RATE: 86 BPM

## 2017-12-28 DIAGNOSIS — F43.9 STRESS AT HOME: ICD-10-CM
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
## 2017-12-28 DIAGNOSIS — Z72.0 TOBACCO ABUSE DISORDER: ICD-10-CM

## 2017-12-28 DIAGNOSIS — F33.1 MODERATE EPISODE OF RECURRENT MAJOR DEPRESSIVE DISORDER (HCC): ICD-10-CM

## 2017-12-28 PROBLEM — J22 ACUTE RESPIRATORY INFECTION: Status: RESOLVED | Noted: 2017-10-12 | Resolved: 2017-12-28

## 2017-12-28 PROCEDURE — 99214 OFFICE O/P EST MOD 30 MIN: CPT | Performed by: PHYSICIAN ASSISTANT

## 2017-12-28 RX ORDER — BUPROPION HYDROCHLORIDE 100 MG/1
TABLET ORAL
Qty: 60 TAB | Refills: 2 | Status: SHIPPED | OUTPATIENT
Start: 2017-12-28

## 2017-12-28 RX ORDER — SERTRALINE HYDROCHLORIDE 25 MG/1
TABLET, FILM COATED ORAL
Qty: 75 TAB | Refills: 0 | Status: SHIPPED | OUTPATIENT
Start: 2017-12-28

## 2017-12-29 NOTE — ASSESSMENT & PLAN NOTE
Patient reports continued marital problems at home including her  possibly having an affair in addition to strange sounds in the attic of her house she believes may be people who her  are bringing over.

## 2017-12-29 NOTE — ASSESSMENT & PLAN NOTE
Patient has been using no antidepressant medication with the exception of Wellbutrin 100 mg tablets to help with smoking cessation. She states, in so many words, that recently her home life has become extremely hectic and that she may need to start an antidepressant medication again..

## 2017-12-29 NOTE — ASSESSMENT & PLAN NOTE
Although patient has made excellent strides at reducing her tobacco use, she is still smoking 0.5 packs per day. This is down from 1.5 packs per day just a few months ago. She does not believe that it will be possible to quit smoking at this time until her home life becomes more, however, she does not wish to increase her smoking intake and will continue to reduce tobacco use but at a much slower rate.

## 2017-12-29 NOTE — PROGRESS NOTES
Chief Complaint   Patient presents with   • Follow-Up     for stopping smoking,pt states fell off wagon       HISTORY OF PRESENT ILLNESS: Patient is a 52 y.o. female established patient who presents today for evaluation and management of:    Moderate episode of recurrent major depressive disorder (CMS-[facility])  Patient has been using no antidepressant medication with the exception of Wellbutrin 100 mg tablets to help with smoking cessation. She states, in so many words, that recently her home life has become extremely hectic and that she may need to start an antidepressant medication again..     Tobacco abuse disorder  Although patient has made excellent strides at reducing her tobacco use, she is still smoking 0.5 packs per day. This is down from 1.5 packs per day just a few months ago. She does not believe that it will be possible to quit smoking at this time until her home life becomes more, however, she does not wish to increase her smoking intake and will continue to reduce tobacco use but at a much slower rate.    Stress at home  Patient reports continued marital problems at home including her  possibly having an affair in addition to strange sounds in the attic of her house she believes may be people who her  are bringing over.    Flea bite of multiple sites  This has reappeared. Patient felt to mention at her previous visit that she has an unfinished home were feral animals may be able to enter her home from the outdoors.       Patient Active Problem List    Diagnosis Date Noted   • Flea bite of multiple sites 10/12/2017   • Moderate episode of recurrent major depressive disorder (CMS-[facility]) 02/23/2017   • Stress at home 02/14/2017   • Morbid obesity with BMI of 40.0-44.9, adult ([facility]) 02/02/2017   • Tobacco abuse disorder 02/02/2017   • Cough 02/02/2017   • Seasonal allergic rhinitis 02/02/2017   • Family history of colon cancer in mother 02/02/2017       Allergies:Pcn [penicillins]; Codeine; and  Amoxicillin    Current Outpatient Prescriptions   Medication Sig Dispense Refill   • buPROPion (WELLBUTRIN) 100 MG Tab Take 50 mg PO qam for 4 days, then, 50 mg PO BID for 4 days then, 100 mg BID PO thereafter 60 Tab 2   • sertraline (ZOLOFT) 25 MG tablet Take 1 tab by mouth every day for 2 weeks, then 2 tab by mouth for 4 weeks then, follow up with provider for recheck 75 Tab 0   • nystatin (MYCOSTATIN) powder Apply  to affected area(s) 4 times a day.     • albuterol 108 (90 Base) MCG/ACT Aero Soln inhalation aerosol Inhale 2 Puffs by mouth every 6 hours as needed for Shortness of Breath. 1 Inhaler 1   • albuterol (ACCUNEB) 0.63 MG/3ML nebulizer solution 3 mL by Nebulization route 3 times a day as needed for Shortness of Breath. 270 mL 3     No current facility-administered medications for this visit.        Social History   Substance Use Topics   • Smoking status: Current Every Day Smoker     Packs/day: 1.50     Years: 40.00     Types: Cigarettes   • Smokeless tobacco: Never Used      Comment: down to 0.5 PPD   • Alcohol use No       Family Status   Relation Status   • Mother Alive   • Father Alive   • Child Alive     Family History   Problem Relation Age of Onset   • Cancer Mother    • Diabetes Father        Review of Systems:   Constitutional: Negative for fever, chills, weight loss and positive for malaise/fatigue.   HENT: Negative for ear pain, nosebleeds, congestion, sore throat and neck pain.    Eyes: Negative for blurred vision.   Respiratory: Positive for daily smoker's cough. Negative for  sputum production, shortness of breath and wheezing.    Cardiovascular: Negative for chest pain, palpitations, orthopnea and leg swelling.   Gastrointestinal: Negative for heartburn, nausea, vomiting and abdominal pain.   Musculoskeletal: Positive for body aches and pains. Negative for myalgias, back pain and joint pain.   Skin: Negative for rash and itching.   Neurological: Negative for dizziness, tingling, tremors,  "sensory change, focal weakness and headaches.   Endo/Heme/Allergies: Does not bruise/bleed easily.   Psychiatric/Behavioral: Positive for depression. Negative for suicidal ideas and memory loss.  The patient is not nervous/anxious and does have insomnia.      Exam:  Blood pressure 152/90, pulse 86, temperature 37.1 °C (98.7 °F), resp. rate 18, height 1.6 m (5' 3\"), weight 116.6 kg (257 lb), SpO2 98 %.  Body mass index is 45.53 kg/m².  General:  Obese female in NAD  Head: is grossly normal.  Neck: Supple without masses. Thyroid is not visibly enlarged.  Pulmonary: Normal effort.   Cardiovascular: Carotid and radial pulses are intact and equal bilaterally.  Extremities: no clubbing, cyanosis, or edema.  Behavioral:  Patient is nearly hysterical due to life situation and numerous events that have occurred in the most recent past. She is crying on and off throughout the visit.     Medical decision-making and discussion:  1. Tobacco abuse disorder  Patient encouraged to continue working to quit but that we will re-evaluate once her mental health has stabilized.   - buPROPion (WELLBUTRIN) 100 MG Tab; Take 50 mg PO qam for 4 days, then, 50 mg PO BID for 4 days then, 100 mg BID PO thereafter  Dispense: 60 Tab; Refill: 2    2. Moderate episode of recurrent major depressive disorder (CMS-HCC)  Patient given referral form and intake packet for [facility] in [place]. She was advised that she should these seeing a counselor to discuss setting better boundaries and developing a better mental health status.  - sertraline (ZOLOFT) 25 MG tablet; Take 1 tab by mouth every day for 2 weeks, then 2 tab by mouth for 4 weeks then, follow up with provider for recheck  Dispense: 75 Tab; Refill: 0  - REFERRAL TO BEHAVIORAL HEALTH    3. Stress at home  See #2 above.     4. Flea bite of multiple sites  Not a priority for this patient today so, not discussed.       Please note that this dictation was created using voice recognition " software. I have made every reasonable attempt to correct obvious errors, but I expect that there are errors of grammar and possibly content that I did not discover before finalizing the note.      Return in about 4 weeks (around 1/25/2018) for depression follow up. .

## 2017-12-29 NOTE — ASSESSMENT & PLAN NOTE
This has reappeared. Patient felt to mention at her previous visit that she has an unfinished home were feral animals may be able to enter her home from the outdoors.

## 2018-01-03 NOTE — TELEPHONE ENCOUNTER
Was the patient seen in the last year in this department? Yes     Does patient have an active prescription for medications requested? No     Received Request Via: Pharmacy        Pt spoke with you about the medication arnuity ellipta, she brought it in and she is requesting refill. Please advise

## 2018-01-23 ENCOUNTER — OFFICE VISIT (OUTPATIENT)
Dept: MEDICAL GROUP | Facility: CLINIC | Age: 53
End: 2018-01-23
Payer: MEDICARE

## 2018-01-23 VITALS
BODY MASS INDEX: 44.83 KG/M2 | WEIGHT: 253 LBS | SYSTOLIC BLOOD PRESSURE: 138 MMHG | DIASTOLIC BLOOD PRESSURE: 72 MMHG | RESPIRATION RATE: 20 BRPM | HEART RATE: 86 BPM | HEIGHT: 63 IN | OXYGEN SATURATION: 95 % | TEMPERATURE: 98.2 F

## 2018-01-23 DIAGNOSIS — B86 SCABIES INFESTATION: ICD-10-CM

## 2018-01-23 DIAGNOSIS — F22 PARANOID BEHAVIOR (HCC): ICD-10-CM

## 2018-01-23 PROCEDURE — 99212 OFFICE O/P EST SF 10 MIN: CPT | Performed by: PHYSICIAN ASSISTANT

## 2018-01-23 RX ORDER — PERMETHRIN 50 MG/G
1 CREAM TOPICAL ONCE
Qty: 3 TUBE | Refills: 0 | Status: SHIPPED | OUTPATIENT
Start: 2018-01-23 | End: 2018-01-23

## 2018-01-23 ASSESSMENT — PAIN SCALES - GENERAL: PAINLEVEL: NO PAIN

## 2018-01-23 ASSESSMENT — PATIENT HEALTH QUESTIONNAIRE - PHQ9: CLINICAL INTERPRETATION OF PHQ2 SCORE: 0

## 2018-01-24 PROBLEM — F22 PARANOID BEHAVIOR (HCC): Status: ACTIVE | Noted: 2018-01-24
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
## 2018-01-24 PROBLEM — B86 SCABIES INFESTATION: Status: ACTIVE | Noted: 2018-01-24

## 2018-01-24 NOTE — ASSESSMENT & PLAN NOTE
Patient presents with many stories about her nieces who she states have been spying on her, recording everything she does, and dumping her clothes outside and covering them in bugs. She suspects her  of collaborating with them and feels that her nieces are out to get her and to pull her  away from her. Her  is verbally abusive. She makes occasional statements that might indicate that she is paranoid such as that she had to cut wires in her home because they were attached to cameras.

## 2018-01-25 NOTE — PROGRESS NOTES
Chief Complaint   Patient presents with   • Itching     all over body/head x 5 days       HISTORY OF PRESENT ILLNESS: Patient is a 52 y.o. female established patient who presents today for evaluation and management of:    Paranoid behavior (CMS-HCC)  Patient presents with many stories about her nieces who she states have been spying on her, recording everything she does, and dumping her clothes outside and covering them in bugs. She suspects her  of collaborating with them and feels that her nieces are out to get her and to pull her  away from her. Her  is verbally abusive. She makes occasional statements that might indicate that she is paranoid such as that she had to cut wires in her home because they were attached to cameras.     Scabies infestation  Patient presents with tracks along her bilateral forearms and lower legs which may represent the burrowing marks of scabies bugs. She states this has been extremely itchy and she denies any recent drug use which would cause a hallucination as such. She does present a sample of what she believes what implanted on her clothes by her nieces but none of what she presents appears to be a bug. She is seeking treatment for this today.       Patient Active Problem List    Diagnosis Date Noted   • Paranoid behavior (CMS-HCC) 01/24/2018   • Scabies infestation 01/24/2018   • Moderate episode of recurrent major depressive disorder (CMS-HCC) 02/23/2017   • Stress at home 02/14/2017   • Morbid obesity with BMI of 40.0-44.9, adult ([facility]) 02/02/2017   • Tobacco abuse disorder 02/02/2017   • Cough 02/02/2017   • Seasonal allergic rhinitis 02/02/2017   • Family history of colon cancer in mother 02/02/2017       Allergies:Pcn [penicillins]; Codeine; and Amoxicillin    Current Outpatient Prescriptions   Medication Sig Dispense Refill   • Fluticasone Furoate (ARNUITY ELLIPTA) 100 MCG/ACT AEROSOL POWDER, BREATH ACTIVATED Inhale 1 Puff by mouth every day. 30 Each 3   •  "buPROPion (WELLBUTRIN) 100 MG Tab Take 50 mg PO qam for 4 days, then, 50 mg PO BID for 4 days then, 100 mg BID PO thereafter 60 Tab 2   • sertraline (ZOLOFT) 25 MG tablet Take 1 tab by mouth every day for 2 weeks, then 2 tab by mouth for 4 weeks then, follow up with provider for recheck 75 Tab 0   • nystatin (MYCOSTATIN) powder Apply  to affected area(s) 4 times a day.     • albuterol 108 (90 Base) MCG/ACT Aero Soln inhalation aerosol Inhale 2 Puffs by mouth every 6 hours as needed for Shortness of Breath. 1 Inhaler 1   • albuterol (ACCUNEB) 0.63 MG/3ML nebulizer solution 3 mL by Nebulization route 3 times a day as needed for Shortness of Breath. 270 mL 3     No current facility-administered medications for this visit.        Social History   Substance Use Topics   • Smoking status: Current Every Day Smoker     Packs/day: 1.50     Years: 40.00     Types: Cigarettes   • Smokeless tobacco: Never Used      Comment: down to 0.5 PPD   • Alcohol use No       Family Status   Relation Status   • Mother Alive   • Father Alive   • Child Alive     Family History   Problem Relation Age of Onset   • Cancer Mother    • Diabetes Father        Review of Systems: See HPI above.   Constitutional: Negative for fever, chills, weight loss and malaise/fatigue.   Respiratory: Negative for shortness of breath.    Cardiovascular: Negative for chest pain  Skin: Positive for rash and itching.   Neurological: Negative for dizziness and headaches.   Psychiatric/Behavioral: See HPI above. Negative for depression, suicidal ideas and memory loss.  The patient is nervous/anxious and does not have insomnia.      Exam:  Blood pressure 138/72, pulse 86, temperature 36.8 °C (98.2 °F), resp. rate 20, height 1.6 m (5' 3\"), weight 114.8 kg (253 lb), SpO2 95 %.  Body mass index is 44.82 kg/m².  General: Morbidly Obese female in NAD  Head: is grossly normal.  Neck: Supple without masses. Thyroid is not visibly enlarged.  Pulmonary: Clear to ausculation. " Normal effort. No rales, ronchi, or wheezing.  Cardiovascular: Regular rate and rhythm without murmur. Carotid and radial pulses are intact and equal bilaterally.  Extremities: no clubbing, cyanosis, or edema.  Skin: excoriations and tracts present on bilateral upper arms and lower legs.     Medical decision-making and discussion:  1. Scabies infestation  - permethrin (ELIMITE) 5 % Cream; Apply 1 Bottle to affected area(s) Once for 1 dose.  Dispense: 3 Tube; Refill: 0    2. Paranoid behavior (CMS-HCC)  Patient advised that she needs to move into a safer situation.       Please note that this dictation was created using voice recognition software. I have made every reasonable attempt to correct obvious errors, but I expect that there are errors of grammar and possibly content that I did not discover before finalizing the note.      Return if symptoms worsen or fail to improve.

## 2018-01-25 NOTE — ASSESSMENT & PLAN NOTE
Patient presents with tracks along her bilateral forearms and lower legs which may represent the burrowing marks of scabies bugs. She states this has been extremely itchy and she denies any recent drug use which would cause a hallucination as such. She does present a sample of what she believes what implanted on her clothes by her nieces but none of what she presents appears to be a bug. She is seeking treatment for this today.

## 2018-02-08 ENCOUNTER — OFFICE VISIT (OUTPATIENT)
Dept: MEDICAL GROUP | Facility: CLINIC | Age: 53
End: 2018-02-08
Payer: MEDICARE

## 2018-02-08 VITALS
OXYGEN SATURATION: 97 % | HEART RATE: 96 BPM | WEIGHT: 253 LBS | BODY MASS INDEX: 44.83 KG/M2 | DIASTOLIC BLOOD PRESSURE: 78 MMHG | SYSTOLIC BLOOD PRESSURE: 130 MMHG | TEMPERATURE: 98.1 F | RESPIRATION RATE: 16 BRPM | HEIGHT: 63 IN

## 2018-02-08 DIAGNOSIS — F33.1 MODERATE EPISODE OF RECURRENT MAJOR DEPRESSIVE DISORDER (HCC): ICD-10-CM

## 2018-02-08 DIAGNOSIS — Z72.0 TOBACCO ABUSE DISORDER: ICD-10-CM

## 2018-02-08 DIAGNOSIS — Z13.220 ENCOUNTER FOR LIPID SCREENING FOR CARDIOVASCULAR DISEASE: ICD-10-CM

## 2018-02-08 DIAGNOSIS — R53.83 LETHARGY: ICD-10-CM

## 2018-02-08 DIAGNOSIS — E66.01 MORBID OBESITY WITH BMI OF 40.0-44.9, ADULT (HCC): ICD-10-CM

## 2018-02-08 DIAGNOSIS — Z13.6 ENCOUNTER FOR LIPID SCREENING FOR CARDIOVASCULAR DISEASE: ICD-10-CM

## 2018-02-08 DIAGNOSIS — Z78.0 MENOPAUSE PRESENT: ICD-10-CM

## 2018-02-08 DIAGNOSIS — Z12.31 VISIT FOR SCREENING MAMMOGRAM: ICD-10-CM

## 2018-02-08 PROCEDURE — 99213 OFFICE O/P EST LOW 20 MIN: CPT | Performed by: PHYSICIAN ASSISTANT

## 2018-02-08 NOTE — ASSESSMENT & PLAN NOTE
Patient states that although she had a hysterectomy many years ago they didn't take her ovaries. She feels night sweats, body aches, lethargy, hot flashes and sleep disturbances. She is requesting hormone therapy for this today. She has no recent mammogram and she does have a family history of colon cancer and a personal history of large tumors on her uterus that she can't recall if these were cancerous.

## 2018-02-08 NOTE — PROGRESS NOTES
Chief Complaint   Patient presents with   • Menopause     hot flashes/mood swings   • Orders Needed     Mammogram       HISTORY OF PRESENT ILLNESS: Patient is a 52 y.o. female established patient who presents today for evaluation and management of:    Morbid obesity with BMI of 40.0-44.9, adult ([facility])  Weight has sustained. Patient feels lethargic and doesn't want to exercise. She doesn't watch her calorie intake.      Tobacco abuse disorder  PAtietn continues smoking 0.5+ppd.     Moderate episode of recurrent major depressive disorder ([facility])  Patient's home life remains chaotic, she is feeling better today.     Menopause present  Patient states that although she had a hysterectomy many years ago they didn't take her ovaries. She feels night sweats, body aches, lethargy, hot flashes and sleep disturbances. She is requesting hormone therapy for this today. She has no recent mammogram and she does have a family history of colon cancer and a personal history of large tumors on her uterus that she can't recall if these were cancerous.        Patient Active Problem List    Diagnosis Date Noted   • Menopause present 02/08/2018   • Lethargy 02/08/2018   • Paranoid behavior (CMS-HCC) 01/24/2018   • Scabies infestation 01/24/2018   • Moderate episode of recurrent major depressive disorder (CMS-HCC) 02/23/2017   • Stress at home 02/14/2017   • Morbid obesity with BMI of 40.0-44.9, adult ([facility]) 02/02/2017   • Tobacco abuse disorder 02/02/2017   • Cough 02/02/2017   • Seasonal allergic rhinitis 02/02/2017   • Family history of colon cancer in mother 02/02/2017       Allergies:Pcn [penicillins] and Codeine    Current Outpatient Prescriptions   Medication Sig Dispense Refill   • Fluticasone Furoate (ARNUITY ELLIPTA) 100 MCG/ACT AEROSOL POWDER, BREATH ACTIVATED Inhale 1 Puff by mouth every day. 30 Each 3   • sertraline (ZOLOFT) 25 MG tablet Take 1 tab by mouth every day for 2 weeks, then 2 tab by mouth for 4 weeks then, follow up  with provider for recheck 75 Tab 0   • nystatin (MYCOSTATIN) powder Apply  to affected area(s) 4 times a day.     • albuterol 108 (90 Base) MCG/ACT Aero Soln inhalation aerosol Inhale 2 Puffs by mouth every 6 hours as needed for Shortness of Breath. 1 Inhaler 1   • buPROPion (WELLBUTRIN) 100 MG Tab Take 50 mg PO qam for 4 days, then, 50 mg PO BID for 4 days then, 100 mg BID PO thereafter 60 Tab 2   • albuterol (ACCUNEB) 0.63 MG/3ML nebulizer solution 3 mL by Nebulization route 3 times a day as needed for Shortness of Breath. 270 mL 3     No current facility-administered medications for this visit.        Social History   Substance Use Topics   • Smoking status: Current Every Day Smoker     Packs/day: 1.50     Years: 40.00     Types: Cigarettes   • Smokeless tobacco: Never Used      Comment: down to 0.5 PPD   • Alcohol use No       Family Status   Relation Status   • Mother Alive   • Father Alive   • Child Alive     Family History   Problem Relation Age of Onset   • Cancer Mother    • Diabetes Father        Review of Systems: See HPI above.   Constitutional: Negative for fever, chills, weight loss and malaise/fatigue.   HENT: Negative for ear pain, nosebleeds, Positive for acute congestion, sore throat and neck pain.  Patient has an acute URI.   Eyes: Negative for blurred vision.   Respiratory: Positive for cough. Negative for sputum production, shortness of breath and wheezing.    Cardiovascular: Negative for chest pain, palpitations, orthopnea and leg swelling.   Gastrointestinal: Negative for heartburn, nausea, vomiting and abdominal pain.   Genitourinary: Negative for dysuria, urgency and frequency.   Musculoskeletal: Negative for myalgias, back pain and joint pain.   Skin: Negative for rash and itching.   Neurological: Negative for dizziness, tingling, tremors, sensory change, focal weakness and headaches.   Endo/Heme/Allergies: Does not bruise/bleed easily. See HPI above.     Exam:  Blood pressure 130/78, pulse  "96, temperature 36.7 °C (98.1 °F), resp. rate 16, height 1.6 m (5' 3\"), weight 114.8 kg (253 lb), SpO2 97 %.  Body mass index is 44.82 kg/m².  General: Morbidly Obese female in NAD  Head: is grossly normal.  Neck: Supple without masses. Thyroid is not visibly enlarged.  Pulmonary: Clear to ausculation. Normal effort. No rales, ronchi, or wheezing.  Cardiovascular: Regular rate and rhythm without murmur. Carotid and radial pulses are intact and equal bilaterally.  Extremities: no clubbing, cyanosis, or edema.    Medical decision-making and discussion:  1. Menopause present  Patient is requesting hormone replacement therapy today but she was educated regarding the increased danger of breast cancer with this. She was advised that if she has a mammogram completed she can be prescribed hormone replacement therapy but she must complete repeat mammography every single year without delay in order to continue HRT.   - CMP (12)  - CBC WITH DIFFERENTIAL  - LIPID PANEL    2. Lethargy  - TSH+FREE T4  - CMP (12)  - CBC WITH DIFFERENTIAL  - VITAMIN B12; Future  - VITAMIN D, 1,25 + 25-HYDROXY    3. Moderate episode of recurrent major depressive disorder (CMS-HCC)  - CMP (12)    4. Tobacco abuse disorder  - CMP (12)    5. Morbid obesity with BMI of 40.0-44.9, adult (HCC)  - CMP (12)  - LIPID PANEL    6. Encounter for lipid screening for cardiovascular disease  - LIPID PANEL    7. Visit for screening mammogram  - MA-MAMMO SCREENING BILAT W/BROOKS W/CAD; Future      Please note that this dictation was created using voice recognition software. I have made every reasonable attempt to correct obvious errors, but I expect that there are errors of grammar and possibly content that I did not discover before finalizing the note.      Return for after mammogram..  "

## 2018-02-08 NOTE — ASSESSMENT & PLAN NOTE
Weight has sustained. Patient feels lethargic and doesn't want to exercise. She doesn't watch her calorie intake.

## 2018-02-14 ENCOUNTER — OFFICE VISIT (OUTPATIENT)
Dept: MEDICAL GROUP | Facility: PHYSICIAN GROUP | Age: 53
End: 2018-02-14
Payer: MEDICARE

## 2018-02-14 VITALS
HEIGHT: 63 IN | OXYGEN SATURATION: 94 % | TEMPERATURE: 98.6 F | WEIGHT: 246 LBS | SYSTOLIC BLOOD PRESSURE: 120 MMHG | DIASTOLIC BLOOD PRESSURE: 80 MMHG | RESPIRATION RATE: 14 BRPM | BODY MASS INDEX: 43.59 KG/M2 | HEART RATE: 104 BPM

## 2018-02-14 DIAGNOSIS — J06.9 VIRAL UPPER RESPIRATORY ILLNESS: ICD-10-CM

## 2018-02-14 DIAGNOSIS — H65.02 ACUTE SEROUS OTITIS MEDIA OF LEFT EAR, RECURRENCE NOT SPECIFIED: ICD-10-CM

## 2018-02-14 DIAGNOSIS — B36.9 FUNGAL SKIN INFECTION: ICD-10-CM

## 2018-02-14 PROCEDURE — 99214 OFFICE O/P EST MOD 30 MIN: CPT | Performed by: NURSE PRACTITIONER

## 2018-02-14 RX ORDER — AMOXICILLIN 500 MG/1
500 TABLET, FILM COATED ORAL 2 TIMES DAILY
Qty: 20 TAB | Refills: 0 | Status: SHIPPED | OUTPATIENT
Start: 2018-02-14 | End: 2018-03-06

## 2018-02-14 RX ORDER — NYSTATIN 100000 [USP'U]/G
POWDER TOPICAL
Qty: 15 G | Refills: 1 | Status: SHIPPED | OUTPATIENT
Start: 2018-02-14

## 2018-02-14 RX ORDER — ALBUTEROL SULFATE 0.63 MG/3ML
0.63 SOLUTION RESPIRATORY (INHALATION) 3 TIMES DAILY PRN
Qty: 270 ML | Refills: 3 | Status: SHIPPED | OUTPATIENT
Start: 2018-02-14

## 2018-02-14 RX ORDER — PREDNISONE 20 MG/1
60 TABLET ORAL DAILY
Qty: 15 TAB | Refills: 0 | Status: SHIPPED | OUTPATIENT
Start: 2018-02-14 | End: 2018-03-06

## 2018-02-14 NOTE — PROGRESS NOTES
Subjective:     Rj Izquierdo is a 52 y.o. female here today for new onset upper respiratory symptoms. She also has a skin complaint.     This is a new problem.   Symptoms include   Cough   Congestion   Sore throat   Sinus pressure   Ear pain    Chills/body aches     Patient denies the following symptoms:  SOB   Nausea/Vomiting   Diarrhea    Sinus pressure     Onset 2 weeks ago, started with cough, however, no ears are hurting    Progression since onset: worsening    Fever: None in clinic today, however, reports she has had a fever up to 101 at home  Treatments tried: neb, rest, OTC measures    Family members/coworker sick with similar symptoms: no    Past Respiratory hx: No significant past medical history   Smoker status:   History   Smoking Status   • Current Every Day Smoker   • Packs/day: 1.50   • Years: 40.00   • Types: Cigarettes   Smokeless Tobacco   • Never Used     Comment: down to 0.5 PPD       No problem-specific Assessment & Plan notes found for this encounter.         Current medicines (including changes today)  Current Outpatient Prescriptions   Medication Sig Dispense Refill   • albuterol (ACCUNEB) 0.63 MG/3ML nebulizer solution 3 mL by Nebulization route 3 times a day as needed for Shortness of Breath. 270 mL 3   • Amoxicillin 500 MG Tab Take 1 Tab by mouth 2 Times a Day. 20 Tab 0   • nystatin (MYCOSTATIN) powder Apply to the affected area up to 4 time daily 15 g 1   • predniSONE (DELTASONE) 20 MG Tab Take 3 Tabs by mouth every day. 15 Tab 0   • Fluticasone Furoate (ARNUITY ELLIPTA) 100 MCG/ACT AEROSOL POWDER, BREATH ACTIVATED Inhale 1 Puff by mouth every day. 30 Each 3   • buPROPion (WELLBUTRIN) 100 MG Tab Take 50 mg PO qam for 4 days, then, 50 mg PO BID for 4 days then, 100 mg BID PO thereafter 60 Tab 2   • sertraline (ZOLOFT) 25 MG tablet Take 1 tab by mouth every day for 2 weeks, then 2 tab by mouth for 4 weeks then, follow up with provider for recheck 75 Tab 0   • nystatin (MYCOSTATIN)  "powder Apply  to affected area(s) 4 times a day.     • albuterol 108 (90 Base) MCG/ACT Aero Soln inhalation aerosol Inhale 2 Puffs by mouth every 6 hours as needed for Shortness of Breath. 1 Inhaler 1     No current facility-administered medications for this visit.        She  has a past medical history of Anemia (2004); ASTHMA; Bladder infection (2012); Degenerative disc disease; Hemorrhoids (2004); Pneumonia (2009); Psychiatric disorder; and Rheumatoid arthritis(714.0).    She  has a past surgical history that includes tonsillectomy and adenoidectomy; endometrial ablation (2005); and abdominal hysterectomy total (11/2015).     Social History     Social History   • Marital status: Single     Spouse name: N/A   • Number of children: N/A   • Years of education: N/A     Social History Main Topics   • Smoking status: Current Every Day Smoker     Packs/day: 1.50     Years: 40.00     Types: Cigarettes   • Smokeless tobacco: Never Used      Comment: down to 0.5 PPD   • Alcohol use No   • Drug use: No   • Sexual activity: Yes     Partners: Male     Other Topics Concern   • Not on file     Social History Narrative   • No narrative on file       Family History   Problem Relation Age of Onset   • Cancer Mother    • Diabetes Father          ROS  Positive for   Cough   Congestion   Sore throat   Sinus pressure   Ear pain    Chills/body aches    No fever, chills, weight loss.   No rash.   No eye redness, eye pain.   Negative for SOB, wheezing.    No chest pain, palpitations, dizziness.   No abdominal pain.     All other systems reviewed and are negative.        Objective:     Blood pressure 120/80, pulse (!) 104, temperature 37 °C (98.6 °F), resp. rate 14, height 1.6 m (5' 3\"), weight 111.6 kg (246 lb), SpO2 94 %. Body mass index is 43.58 kg/m².    Physical Exam:   Constitutional: Alert, no distress. Patient not toxic or lethargic.   Eye: Equal, round and reactive, conjunctiva clear, lids normal.   ENMT: Lips without lesions, " oropharynx clear.   TMs right normal, TM left erythematous, bulging.   No nasal drainage, normal appearance of external nose.   No pain with palpation of sinuses   Neck: Trachea midline, no masses. No cervical or supraclavicular lymphadenopathy  Respiratory: Unlabored respiratory effort, lungs clear to auscultation, bilateral wheezes, no ronchi.  Cardiovascular: Normal S1, S2, no murmur, no edema.   Abdomen: Soft, non-tender, no masses, no hepatosplenomegaly. Normal bowel sounds.   Skin: Warm, dry, good turgor, no rashes in visible areas. She does have fungal-like infection on right breast, she has a prescript for Nystatin which she has run out of, requests a refill. No s/s of cellulitis.   Psych: Alert and oriented x3, normal affect and mood.        Assessment and Plan:   The following treatment plan was discussed    1. Acute serous otitis media of left ear, recurrence not specified  Advised warm compress, Advise please complete course of antibiotics unless otherwise directed by a healthcare professional. Take with food and adequate fluid intake.    - Amoxicillin 500 MG Tab; Take 1 Tab by mouth 2 Times a Day.  Dispense: 20 Tab; Refill: 0    2. Viral upper respiratory illness  Continue with neb. Encouraged smoking cessation. She has tolerated steroids in the past well.   - albuterol (ACCUNEB) 0.63 MG/3ML nebulizer solution; 3 mL by Nebulization route 3 times a day as needed for Shortness of Breath.  Dispense: 270 mL; Refill: 3  - predniSONE (DELTASONE) 20 MG Tab; Take 3 Tabs by mouth every day.  Dispense: 15 Tab; Refill: 0    3. Fungal skin infection  - nystatin (MYCOSTATIN) powder; Apply to the affected area up to 4 time daily  Dispense: 15 g; Refill: 1       Advised patient to rest, increase fluids   Sinus rinse as needed for congestion   Salt water gargle for sore throat as needed   Discussed s/s to seek emergent care.  RTC if symptoms worsen or do not resolve.       Reviewed indication, dosage, usage and  potential adverse effects of prescribed medications. Patient appears to understand, verbalizes understanding and is willing to try medications as prescribed.      Reviewed risks and benefits of treatment plan. Patient verbally agrees to plan of care.       Followup: Return if symptoms worsen or fail to improve.    KELLEY Mccracken.     PLEASE NOTE: This dictation was created using voice recognition software. I have made every reasonable attempt to correct obvious errors, but I expect that there may be errors of grammar and possibly content that I did not discover prior finalizing this note.

## 2018-02-20 ENCOUNTER — HOSPITAL ENCOUNTER (OUTPATIENT)
Dept: RADIOLOGY | Facility: MEDICAL CENTER | Age: 53
End: 2018-02-20
Attending: PHYSICIAN ASSISTANT
Payer: MEDICARE

## 2018-02-20 DIAGNOSIS — Z12.31 VISIT FOR SCREENING MAMMOGRAM: ICD-10-CM

## 2018-02-20 PROCEDURE — 77067 SCR MAMMO BI INCL CAD: CPT

## 2018-02-21 ENCOUNTER — APPOINTMENT (OUTPATIENT)
Dept: RADIOLOGY | Facility: MEDICAL CENTER | Age: 53
End: 2018-02-21
Attending: PHYSICIAN ASSISTANT
Payer: MEDICARE

## 2018-03-06 ENCOUNTER — OFFICE VISIT (OUTPATIENT)
Dept: MEDICAL GROUP | Facility: CLINIC | Age: 53
End: 2018-03-06
Payer: MEDICARE

## 2018-03-06 VITALS
BODY MASS INDEX: 44.47 KG/M2 | TEMPERATURE: 98.1 F | HEIGHT: 63 IN | DIASTOLIC BLOOD PRESSURE: 74 MMHG | SYSTOLIC BLOOD PRESSURE: 130 MMHG | OXYGEN SATURATION: 98 % | RESPIRATION RATE: 20 BRPM | WEIGHT: 251 LBS | HEART RATE: 80 BPM

## 2018-03-06 DIAGNOSIS — L29.9 ITCHING: ICD-10-CM

## 2018-03-06 DIAGNOSIS — F22 PARANOID BEHAVIOR (HCC): ICD-10-CM

## 2018-03-06 DIAGNOSIS — R44.0 HEARING VOICES: ICD-10-CM

## 2018-03-06 DIAGNOSIS — R05.9 COUGH: ICD-10-CM

## 2018-03-06 PROBLEM — R53.83 LETHARGY: Status: RESOLVED | Noted: 2018-02-08 | Resolved: 2018-03-06

## 2018-03-06 PROBLEM — B86 SCABIES INFESTATION: Status: RESOLVED | Noted: 2018-01-24 | Resolved: 2018-03-06

## 2018-03-06 PROCEDURE — 99214 OFFICE O/P EST MOD 30 MIN: CPT | Performed by: NURSE PRACTITIONER

## 2018-03-06 RX ORDER — HYDROXYZINE HYDROCHLORIDE 25 MG/1
25 TABLET, FILM COATED ORAL 3 TIMES DAILY PRN
Qty: 30 TAB | Refills: 0 | Status: SHIPPED | OUTPATIENT
Start: 2018-03-06 | End: 2018-11-09

## 2018-03-06 RX ORDER — BENZONATATE 100 MG/1
100 CAPSULE ORAL 3 TIMES DAILY PRN
Qty: 60 CAP | Refills: 0 | Status: SHIPPED | OUTPATIENT
Start: 2018-03-06 | End: 2018-11-09
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
## 2018-03-06 NOTE — ASSESSMENT & PLAN NOTE
Patient reports since last visit her cough has continued. Her history reporting is erratic at times.
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
## 2018-03-06 NOTE — ASSESSMENT & PLAN NOTE
Patient was recently treated for scabies, which per provider and note patient likely did not have? She reports continued itching.

## 2018-03-06 NOTE — PATIENT INSTRUCTIONS
Your medical care was provided today by: JULIANA Pierre    Thank You for the opportunity to serve you.    You may receive a brief survey in the mail shortly regarding your visit today. Please take a few moments to complete the survey and return it; no postage is necessary. We are working to serve our patient population better, improve customer service and our patients overall experience and your input can help us to accomplish this. We thank you for your help and for the opportunity to serve you today and in the future.     Special Instructions:  Always call 9-1-1 immediately if you develop a life threatening emergency.    Unless told otherwise please take all medications as directed and complete prescription therapies.     Watch for the following signs that require additional evaluation: progressive lethargy or unresponsiveness, localized pain (chest, abdomen), shortness of breath, painful breathing, progressive vomiting with weakness, bloody stools, or new rash.     If you are prescribed pain medication or any other medication that is sedating, do not take medication before or while operating a vehicle or heavy machinery or equipment due to potential side effects such as drowsiness and/or dizziness.

## 2018-04-18 ENCOUNTER — HOSPITAL ENCOUNTER (OUTPATIENT)
Facility: MEDICAL CENTER | Age: 53
End: 2018-04-18
Attending: NURSE PRACTITIONER
Payer: MEDICARE

## 2018-04-18 PROCEDURE — 82274 ASSAY TEST FOR BLOOD FECAL: CPT

## 2018-04-24 LAB — HEMOCCULT STL QL IA: POSITIVE

## 2018-05-08 ENCOUNTER — TELEPHONE (OUTPATIENT)
Dept: MEDICAL GROUP | Facility: CLINIC | Age: 53
End: 2018-05-08

## 2018-05-08 NOTE — TELEPHONE ENCOUNTER
Notes recorded by RHODA Mccracken on 4/26/2018 at 10:37 AM PDT  Patient completed colonoscopy 4/7 - has she since followed up?   Rocael Curtis, APRN

## 2018-05-11 NOTE — TELEPHONE ENCOUNTER
Patient returned call - she went in for colonoscopy and they removed a few polyps - she just submitted a new FIT per GI.

## 2018-11-09 ENCOUNTER — OFFICE VISIT (OUTPATIENT)
Dept: URGENT CARE | Facility: PHYSICIAN GROUP | Age: 53
End: 2018-11-09
Payer: MEDICARE

## 2018-11-09 VITALS
DIASTOLIC BLOOD PRESSURE: 78 MMHG | TEMPERATURE: 98.9 F | WEIGHT: 266.6 LBS | OXYGEN SATURATION: 99 % | SYSTOLIC BLOOD PRESSURE: 132 MMHG | HEART RATE: 92 BPM | BODY MASS INDEX: 47.23 KG/M2 | RESPIRATION RATE: 16 BRPM

## 2018-11-09 DIAGNOSIS — K04.7 TOOTH INFECTION: ICD-10-CM

## 2018-11-09 PROCEDURE — 99214 OFFICE O/P EST MOD 30 MIN: CPT | Performed by: FAMILY MEDICINE

## 2018-11-09 RX ORDER — CLINDAMYCIN HYDROCHLORIDE 300 MG/1
300 CAPSULE ORAL 3 TIMES DAILY
Qty: 30 CAP | Refills: 0 | Status: SHIPPED | OUTPATIENT
Start: 2018-11-09 | End: 2018-11-19

## 2018-11-09 NOTE — PROGRESS NOTES
Chief Complaint:    Chief Complaint   Patient presents with   • Tooth Abscess       History of Present Illness:    This is a new problem. Has pain in right lower tooth and adjacent swelling and pain in right face, at least moderate severity and not getting better. Already taking Ibuprofen and Tylenol for other MSK issues. Symptoms started yesterday. She was treated for similar by ER few months ago, she believes with Clindamycin which worked and was tolerated. However, it costs too much to see Dentist, so she has not gotten tooth problem permanently addressed.      Review of Systems:    Constitutional: Negative for fever, chills, and diaphoresis.   Eyes: Negative for change in vision, photophobia, pain, redness, and discharge.  ENT: See HPI.    Respiratory: Negative for cough, hemoptysis, sputum production, shortness of breath, wheezing, and stridor.    Cardiovascular: Negative for chest pain, palpitations, orthopnea, claudication, leg swelling, and PND.   Gastrointestinal: Negative for abdominal pain, nausea, vomiting, diarrhea, constipation, blood in stool, and melena.   Genitourinary: Negative for dysuria, urinary urgency, urinary frequency, hematuria, and flank pain.   Musculoskeletal: See HPI.  Skin: Negative for rash and itching.   Neurological: Negative for dizziness, tingling, tremors, sensory change, speech change, focal weakness, seizures, loss of consciousness, and headaches.   Endo: Negative for polydipsia.   Heme: Does not bruise/bleed easily.   Psychiatric/Behavioral: No new symptoms.       Past Medical History:    Past Medical History:   Diagnosis Date   • Anemia 2004   • ASTHMA    • Bladder infection 2012   • Degenerative disc disease    • Hemorrhoids 2004   • Pneumonia 2009   • Psychiatric disorder     depression, anxiety   • Rheumatoid arthritis(714.0)      Past Surgical History:    Past Surgical History:   Procedure Laterality Date   • ABDOMINAL HYSTERECTOMY TOTAL  11/2015   • ENDOMETRIAL ABLATION   2005   • TONSILLECTOMY AND ADENOIDECTOMY       Social History:    Social History     Social History   • Marital status: Single     Spouse name: N/A   • Number of children: N/A   • Years of education: N/A     Occupational History   • Not on file.     Social History Main Topics   • Smoking status: Current Every Day Smoker     Packs/day: 0.50     Years: 40.00     Types: Cigarettes   • Smokeless tobacco: Never Used      Comment: down to 0.5 PPD   • Alcohol use No   • Drug use: No   • Sexual activity: Yes     Partners: Male     Other Topics Concern   • Not on file     Social History Narrative   • No narrative on file     Family History:    Family History   Problem Relation Age of Onset   • Cancer Mother    • Diabetes Father      Medications:    Current Outpatient Prescriptions on File Prior to Visit   Medication Sig Dispense Refill   • albuterol (ACCUNEB) 0.63 MG/3ML nebulizer solution 3 mL by Nebulization route 3 times a day as needed for Shortness of Breath. 270 mL 3   • nystatin (MYCOSTATIN) powder Apply to the affected area up to 4 time daily 15 g 1   • Fluticasone Furoate (ARNUITY ELLIPTA) 100 MCG/ACT AEROSOL POWDER, BREATH ACTIVATED Inhale 1 Puff by mouth every day. 30 Each 3   • buPROPion (WELLBUTRIN) 100 MG Tab Take 50 mg PO qam for 4 days, then, 50 mg PO BID for 4 days then, 100 mg BID PO thereafter 60 Tab 2   • sertraline (ZOLOFT) 25 MG tablet Take 1 tab by mouth every day for 2 weeks, then 2 tab by mouth for 4 weeks then, follow up with provider for recheck 75 Tab 0   • nystatin (MYCOSTATIN) powder Apply  to affected area(s) 4 times a day.     • albuterol 108 (90 Base) MCG/ACT Aero Soln inhalation aerosol Inhale 2 Puffs by mouth every 6 hours as needed for Shortness of Breath. 1 Inhaler 1     No current facility-administered medications on file prior to visit.      Allergies:    Allergies   Allergen Reactions   • Pcn [Penicillins] Vomiting   • Codeine        Vitals:    Vitals:    11/09/18 1208   BP: 132/78    Pulse: 92   Resp: 16   Temp: 37.2 °C (98.9 °F)   SpO2: 99%   Weight: 120.9 kg (266 lb 9.6 oz)       Physical Exam:    Constitutional: Vital signs reviewed. Appears well-developed and well-nourished. No acute distress.   Eyes: Sclera white, conjunctivae clear.   ENT: Right lower bicuspid is tender to palpation. Missing teeth posterior to this tooth in right lower mouth. There is swelling in right lower face adjacent to problem tooth. External ears normal. Hearing normal. Lips are normal. Oral mucosa pink and moist. Posterior pharynx: WNL.  Neck: Neck supple.   Pulmonary/Chest: Respirations non-labored.   Lymph: Cervical nodes without tenderness or enlargement.  Musculoskeletal: Normal gait. No muscular atrophy or weakness.  Neurological: Alert and oriented to person, place, and time. Muscle tone normal. Coordination normal.   Skin: No rashes or lesions. Warm, dry, normal turgor.  Psychiatric: Normal mood and affect. Behavior is normal. Judgment and thought content normal.       Assessment / Plan:    1. Tooth infection  - clindamycin (CLEOCIN) 300 MG Cap; Take 1 Cap by mouth 3 times a day for 10 days.  Dispense: 30 Cap; Refill: 0      Discussed with her DDX, management options, and risks, benefits, and alternatives to treatment plan agreed upon.    Agreeable to medication prescribed.    Advised to see Dentist for definitive treatment.    Discussed expected course of duration, time for improvement, and to seek follow-up in Emergency Room, urgent care, or with PCP if getting worse at any time or not improving within expected time frame.

## 2021-01-23 NOTE — ASSESSMENT & PLAN NOTE
40+-pack-year history of smoking. Reports she is motivated to quit, however is interested in [employer].   Problem: Suicide risk  Goal: Provide patient with safe environment  Description: Provide patient with safe environment  1/23/2021 1505 by Radha Lawson RN  Outcome: Met This Shift  Note: Patient denies suicidal ideation this shift. 1/23/2021 0116 by Dean Alejandre RN  Outcome: Ongoing  Note: The patient is checked every 15 minutes during day time hours and every 30 minutes during night time hours to ensure safety. Problem: Altered Mood, Depressive Behavior:  Goal: Able to verbalize acceptance of life and situations over which he or she has no control  Description: Able to verbalize acceptance of life and situations over which he or she has no control  1/23/2021 1505 by Radha Lawson RN  Outcome: Met This Shift  Note: Patient denies depression this shift. 1/23/2021 0116 by Dean Alejandre RN  Outcome: Ongoing  Note: The patient continues to work toward accepting life situations. Goal: Able to verbalize and/or display a decrease in depressive symptoms  Description: Able to verbalize and/or display a decrease in depressive symptoms  1/23/2021 1505 by Radha Lawson RN  Outcome: Met This Shift  Note: Patient denies depression this shift. 1/23/2021 0116 by Dean Alejandre RN  Outcome: Ongoing  Note: the patient states she does not feel depressed. Goal: Ability to disclose and discuss suicidal ideas will improve  Description: Ability to disclose and discuss suicidal ideas will improve  1/23/2021 1505 by Radha Lawson RN  Outcome: Met This Shift  Note: Patient denies suicidal ideation this shift. 1/23/2021 0116 by Dean Alejandre RN  Outcome: Ongoing  Note: The patient denies suicidal ideation. Goal: Able to verbalize support systems  Description: Able to verbalize support systems  1/23/2021 1505 by Radha Lawson RN  Outcome: Met This Shift  1/23/2021 0116 by Dean Alejandre RN  Outcome: Ongoing  Note: the patient states her support system is her boyfriend and sister. Goal: Absence of self-harm  Description: Absence of self-harm  1/23/2021 1505 by Omer Ledesma RN  Outcome: Met This Shift  Note: Patient makes no attempt to harm self this shift. 1/23/2021 0116 by Ashley Gary RN  Outcome: Ongoing  Note: The patient remains absent of self-harm. Goal: Patient specific goal  Description: Patient specific goal  1/23/2021 1505 by Omer Ledesma RN  Outcome: Ongoing  Note: Patient makes no goal this shift. Said \" not really\" when asked if she had a goal.   1/23/2021 0116 by Ashley Gary RN  Outcome: Ongoing  Note: the patient did not make a goal today. Goal: Participates in care planning  Description: Participates in care planning  1/23/2021 1505 by Omer Ledesma RN  Outcome: Ongoing  Note: Patient participates in care planning with social work this shift. 1/23/2021 0116 by Ashley Gary RN  Outcome: Ongoing  Note: The patient participates in interdisciplinary care planning.